# Patient Record
Sex: FEMALE | Race: WHITE | NOT HISPANIC OR LATINO | Employment: UNEMPLOYED | URBAN - METROPOLITAN AREA
[De-identification: names, ages, dates, MRNs, and addresses within clinical notes are randomized per-mention and may not be internally consistent; named-entity substitution may affect disease eponyms.]

---

## 2018-09-27 ENCOUNTER — DOCUMENTATION (OUTPATIENT)
Dept: BARIATRICS/WEIGHT MGMT | Facility: CLINIC | Age: 33
End: 2018-09-27

## 2018-09-27 RX ORDER — LOSARTAN POTASSIUM 25 MG/1
25 TABLET ORAL DAILY
COMMUNITY
End: 2019-02-05

## 2018-09-27 NOTE — PROGRESS NOTES
Weight Hx:     Karlee has been overweight for at least 7 years, has been 35 pounds or more overweight for at least 7 years, has been 100 pounds or more overweight for 2 or more years and started dieting at age 25.      Previous diet attempts include: Herbal Life, Low Carbohydrate, Calorie Counting, Lukas's Diet and Slim Fast; /diet plans; Ionamin/Adipex.  The most weight Karlee lost was - pounds on - but was only able to maintain that weight loss for -.  Her maximum lifetime weight is 245 pounds.

## 2018-10-09 ENCOUNTER — DOCUMENTATION (OUTPATIENT)
Dept: BARIATRICS/WEIGHT MGMT | Facility: CLINIC | Age: 33
End: 2018-10-09

## 2018-10-09 ENCOUNTER — OFFICE VISIT (OUTPATIENT)
Dept: BARIATRICS/WEIGHT MGMT | Facility: CLINIC | Age: 33
End: 2018-10-09

## 2018-10-09 ENCOUNTER — OFFICE VISIT (OUTPATIENT)
Dept: PSYCHIATRY | Facility: CLINIC | Age: 33
End: 2018-10-09

## 2018-10-09 VITALS
TEMPERATURE: 97.9 F | HEIGHT: 66 IN | DIASTOLIC BLOOD PRESSURE: 72 MMHG | SYSTOLIC BLOOD PRESSURE: 118 MMHG | RESPIRATION RATE: 18 BRPM | BODY MASS INDEX: 40.66 KG/M2 | WEIGHT: 253 LBS | OXYGEN SATURATION: 99 % | HEART RATE: 92 BPM

## 2018-10-09 DIAGNOSIS — E66.01 MORBID OBESITY (HCC): ICD-10-CM

## 2018-10-09 DIAGNOSIS — F43.21 SITUATIONAL DEPRESSION: ICD-10-CM

## 2018-10-09 DIAGNOSIS — Z87.891 HISTORY OF TOBACCO USE: ICD-10-CM

## 2018-10-09 DIAGNOSIS — I10 HYPERTENSION, UNSPECIFIED TYPE: ICD-10-CM

## 2018-10-09 DIAGNOSIS — R53.83 FATIGUE, UNSPECIFIED TYPE: ICD-10-CM

## 2018-10-09 DIAGNOSIS — D50.9 IRON DEFICIENCY ANEMIA, UNSPECIFIED IRON DEFICIENCY ANEMIA TYPE: ICD-10-CM

## 2018-10-09 DIAGNOSIS — R12 HEARTBURN: Primary | ICD-10-CM

## 2018-10-09 DIAGNOSIS — F43.9 FEELING STRESSED OUT: Primary | ICD-10-CM

## 2018-10-09 DIAGNOSIS — R06.09 DYSPNEA ON EXERTION: ICD-10-CM

## 2018-10-09 LAB
ALBUMIN SERPL-MCNC: 4.27 G/DL (ref 3.2–4.8)
ALBUMIN/GLOB SERPL: 2 G/DL (ref 1.5–2.5)
ALP SERPL-CCNC: 70 U/L (ref 25–100)
ALT SERPL-CCNC: 29 U/L (ref 7–40)
AST SERPL-CCNC: 23 U/L (ref 0–33)
BASOPHILS # BLD AUTO: 0.05 10*3/MM3 (ref 0–0.2)
BASOPHILS NFR BLD AUTO: 0.5 % (ref 0–1)
BILIRUB SERPL-MCNC: 0.4 MG/DL (ref 0.3–1.2)
BUN SERPL-MCNC: 12 MG/DL (ref 9–23)
BUN/CREAT SERPL: 20.7 (ref 7–25)
CALCIUM SERPL-MCNC: 9.1 MG/DL (ref 8.7–10.4)
CHLORIDE SERPL-SCNC: 105 MMOL/L (ref 99–109)
CHOLEST SERPL-MCNC: 183 MG/DL (ref 0–200)
CO2 SERPL-SCNC: 29 MMOL/L (ref 20–31)
CREAT SERPL-MCNC: 0.58 MG/DL (ref 0.6–1.3)
EOSINOPHIL # BLD AUTO: 0.25 10*3/MM3 (ref 0–0.3)
EOSINOPHIL NFR BLD AUTO: 2.4 % (ref 0–3)
ERYTHROCYTE [DISTWIDTH] IN BLOOD BY AUTOMATED COUNT: 12.3 % (ref 11.3–14.5)
GLOBULIN SER CALC-MCNC: 2.1 GM/DL
GLUCOSE SERPL-MCNC: 81 MG/DL (ref 70–100)
HCT VFR BLD AUTO: 38.2 % (ref 34.5–44)
HDLC SERPL-MCNC: 39 MG/DL (ref 40–60)
HGB BLD-MCNC: 12.8 G/DL (ref 11.5–15.5)
IMM GRANULOCYTES # BLD: 0.05 10*3/MM3 (ref 0–0.03)
IMM GRANULOCYTES NFR BLD: 0.5 % (ref 0–0.6)
LDLC SERPL CALC-MCNC: 99 MG/DL (ref 0–100)
LYMPHOCYTES # BLD AUTO: 3.17 10*3/MM3 (ref 0.6–4.8)
LYMPHOCYTES NFR BLD AUTO: 30.5 % (ref 24–44)
MCH RBC QN AUTO: 32.1 PG (ref 27–31)
MCHC RBC AUTO-ENTMCNC: 33.5 G/DL (ref 32–36)
MCV RBC AUTO: 95.7 FL (ref 80–99)
MONOCYTES # BLD AUTO: 0.9 10*3/MM3 (ref 0–1)
MONOCYTES NFR BLD AUTO: 8.7 % (ref 0–12)
NEUTROPHILS # BLD AUTO: 6.01 10*3/MM3 (ref 1.5–8.3)
NEUTROPHILS NFR BLD AUTO: 57.9 % (ref 41–71)
PLATELET # BLD AUTO: 297 10*3/MM3 (ref 150–450)
POTASSIUM SERPL-SCNC: 4 MMOL/L (ref 3.5–5.5)
PROT SERPL-MCNC: 6.4 G/DL (ref 5.7–8.2)
RBC # BLD AUTO: 3.99 10*6/MM3 (ref 3.89–5.14)
SODIUM SERPL-SCNC: 139 MMOL/L (ref 132–146)
TRIGL SERPL-MCNC: 226 MG/DL (ref 0–150)
TSH SERPL DL<=0.005 MIU/L-ACNC: 1.87 MIU/ML (ref 0.35–5.35)
VLDLC SERPL CALC-MCNC: 45.2 MG/DL
WBC # BLD AUTO: 10.38 10*3/MM3 (ref 3.5–10.8)

## 2018-10-09 PROCEDURE — 90791 PSYCH DIAGNOSTIC EVALUATION: CPT | Performed by: PSYCHOLOGIST

## 2018-10-09 PROCEDURE — 99204 OFFICE O/P NEW MOD 45 MIN: CPT | Performed by: PHYSICIAN ASSISTANT

## 2018-10-09 RX ORDER — CLONAZEPAM 0.25 MG/1
0.12 TABLET, ORALLY DISINTEGRATING ORAL 2 TIMES DAILY PRN
COMMUNITY

## 2018-10-09 RX ORDER — CEPHALEXIN 500 MG/1
500 CAPSULE ORAL 2 TIMES DAILY
COMMUNITY
End: 2018-11-14

## 2018-10-09 NOTE — PROGRESS NOTES
PROGRESS NOTE    Data:  Karlee De La Cruz is a 32 y.o. female who met with the undersigned for a scheduled individual outpatient therapy session from 9:00 - 9:40am.      Clinical Maneuvering/Intervention:      Pt talked about struggling with obesity for the past couple of years. Despite trying different weight loss plans and diets, the pt reported being unsuccessful in losing weight. She has tried weight watchers, working with a , and other diets. A psychological evaluation was conducted in order to assess past and current level of functioning. Areas assessed included, but were not limited to: perception of social support, perception of ability to face and deal with challenges in life (positive functioning), anxiety symptoms, depressive symptoms, perspective on beliefs/belief system, coping skills for stress, intelligence level, etc. Therapeutic rapport was established. Interventions conducted today were geared towards assessing the pt's readiness for weight loss surgery and identifying and psychological contraindications for undergoing such a major life change. Social support was deemed strong (specific to weight loss surgery/weight loss in this manner and in a general sense): mother, family members, and friends. Current psychological struggles were deemed moderate and included: recent separation from , starting school (but she enjoys it), feeling very upset/defeated for having gained quite a bit of weight, and raising her two children.Stress level was deemed moderate and related to  from her  and being overweight. Coping skills for distress and related to undergoing a major life change such as weight loss surgery/weight loss were deemed strong and included: having a very strong social support system, enjoying school, loving being a mother, and being more excited about weight loss surgery than nervous. The pt endorsed having characteristics of readiness to improve quality of  "life through the major life changes inherent in the journey of weight loss surgery as she could see how she has suffered enough (hit \"emotional rock bottom\") to now want to change. The pt could also articulate a deep sense of purpose in undergoing this major life change by saying that she want to do it to improve her self-esteem, quality of life, and be a better mother.  Demonstration of positive and appropriate dietary changes were not evident and she expressed planning to change soon (when she is told to do so).    Mental Status Exam  Hygiene:  good  Dress: normal  Attitude:  Cooperative and proactive  Motor Activity: normal  Speech: normal  Mood:  defeated but hopeful  Affect:  congruent  Thought Processes: normal  Thought Content:  normal  Suicidal Thoughts:  not endorsed  Homicidal Thoughts:  not endorsed  Crisis Safety Plan: not needed   Hallucinations:  none      Patient's Support Network Includes:  family, friends      Progress toward goal: there is evidence to suggest that she is taking measures to improve the quality of her life including seeking weight loss surgery.      Functional Status: moderate      Prognosis: good    Assessment      The pt presented to be stressed about being  from her  and feeling depressed about being overweight; otherwise she seems to be functioning well (likely due to having a strong social support system and other coping skills).     From a psychological standpoint, the pt presents as a good candidate for bariatric surgery.  She is motivated for the surgery, has showed readiness for the lifestyle change in terms of planning to soon adjust her eating habits, and seems to have appropriate expectations of how to prepare and how to live after surgery in order to lose weight successfully.      Plan      In order to diminish symptoms depression surrounding obesity, the pt is to follow up with her bariatric surgeon in order to receive weight loss surgery as soon as " feasible/appropriate and based on success with compliance to adhering to the proper diet. In order to diminish stress (and because she hopes to work things out with her ), she is to start couples counseling with him post-weight loss surgery. Instructions on how to pursue this were provided.    Deonna Franco, PhD, LP

## 2018-10-09 NOTE — PROGRESS NOTES
[facility] BARIATRIC SURGERY  [address]  [facility] [address]  [phone]      Patient  Name:  Karlee De La Cruz  :  1985      Date of Visit: 10/09/2018      Chief Complaint:  weight gain; unable to maintain weight loss    History of Present Illness:  Karlee De La Cruz is a 32 y.o. female who presents today for evaluation, education and consultation regarding weight loss surgery. The patient is interested in sleeve gastrectomy with Dr. Betancur.     Karlee has been overweight for at least 7 years, has been 35 pounds or more overweight for at least 7 years, has been 100 pounds or more overweight for 2 or more years and started dieting at age 25.  Previous diet attempts include: Herbal Life, Low Carbohydrate, Calorie Counting, Lukas's Diet and Slim Fast; Ionamin/Adipex.  Her maximum lifetime weight is 245 pounds.    As above, patient has been overweight for many years, with numerous failed dietary/weight loss attempts.  She now has obesity related comorbidities and as such has decided to pursue weight loss surgery.  All past medical, surgical, social and family history have been obtained and discussed today, as pertinent to bariatric surgery.     Past Medical History:   Diagnosis Date   • Anxiety    • Dyspepsia    • Dyspnea on exertion    • Fatigue    • Former smoker     quit    • Heartburn     prn Rolaids, denies prior eval   • Hypertension    • Iron deficiency anemia     r/t heavy menstrual bleeding   • Morbid obesity (CMS/HCC)      Past Surgical History:   Procedure Laterality Date   • LAPAROSCOPIC APPENDECTOMY     • LAPAROSCOPIC CHOLECYSTECTOMY      for stones       No Known Allergies    Current Outpatient Prescriptions:   •  cephalexin (KEFLEX) 500 MG capsule, Take 500 mg by mouth 2 (Two) Times a Day., Disp: , Rfl:   •  clonazePAM (KlonoPIN) 0.25 MG disintegrating tablet, Take 0.25 mg by mouth 2 (Two) Times a Day As Needed for Anxiety., Disp: , Rfl:    •  losartan (COZAAR) 25 MG tablet, Take 25 mg by mouth Daily., Disp: , Rfl:     Social History     Social History   • Marital status: Legally      Spouse name: N/A   • Number of children: N/A   • Years of education: N/A     Occupational History   • Not on file.     Social History Main Topics   • Smoking status: Former Smoker     Years: 10.00     Quit date: 2010   • Smokeless tobacco: Never Used   • Alcohol use Yes      Comment: only on the weekends   • Drug use: Yes      Comment: denies use 10+ years   • Sexual activity: Not on file     Other Topics Concern   • Not on file     Social History Narrative    Living in [place] w/ 2 daughters.  In [employer] school.     Family History   Problem Relation Age of Onset   • Hypertension Mother    • Hypertension Father    • Sleep apnea Father    • Hypertension Maternal Grandmother    • Hypertension Maternal Grandfather        Review of Systems:  Constitutional:  denies fevers, chills.  HEENT:  denies headache, ear pain or loss of hearing, blurred or double vision, nasal discharge or sore throat.  Cardiovascular:  denies hx heart disease, chest pain, palpitations, hx DVT.  Respiratory:  denies cough , wheezing, sleep apnea, asthma, hx PE.  Gastrointestinal:  reports heartburn and denies nausea, vomiting, abdominal pain, IBS, liver disease.  Genitourinary:  denies history of  frequent UTI, hematuria, dysuria, renal insufficiency.    Musculoskeletal:  denies joint pain, fibromyalgia, arthritis and autoimmune disease.  Neurological:  denies migraines, dizziness, confusion.  Psychiatric:  denies depression, bipolar disorder.  Endocrine:  denies glucose intolerance, diabetes, thyroid disease.  Hematologic:  denies bleeding disorder, hx blood transfusion.  Skin:  denies rashes, hx MRSA.    Physical Exam:  Vital Signs:  Weight: 115 kg (253 lb)   Body mass index is 41.46 kg/m².  Temp: 97.9 °F (36.6 °C)   Heart Rate: 92   BP: 118/72     Physical Exam    Constitutional: She is oriented to person, place, and time. She appears well-developed and well-nourished.   HENT:   Head: Normocephalic and atraumatic.   Eyes: Conjunctivae are normal. No scleral icterus.   Neck: Neck supple. No thyromegaly present.   Cardiovascular: Normal rate and regular rhythm.    No murmur heard.  Pulmonary/Chest: Effort normal and breath sounds normal. No respiratory distress. She has no wheezes. She has no rales.   Abdominal: Soft. Bowel sounds are normal. She exhibits no distension and no mass. There is no tenderness. No hernia.   lap hunter scars w/ keloids   Musculoskeletal: Normal range of motion. She exhibits no edema.   Neurological: She is alert and oriented to person, place, and time. Gait normal.   Skin: Skin is warm and dry. No rash noted.   Psychiatric: She has a normal mood and affect. Judgment normal.   Vitals reviewed.      Patient Active Problem List   Diagnosis   • Morbid obesity (CMS/HCC)   • Fatigue   • Dyspepsia   • Dyspnea on exertion   • Anxiety   • Hypertension   • Former smoker   • Heartburn   • Iron deficiency anemia       Assessment:    Karlee De La Cruz is a 32 y.o. female with medically complicated obesity pursuing sleeve gastrectomy.    Weight loss surgery is deemed medically necessary given the following obesity related comorbidities including hypertension with current Weight: 115 kg (253 lb) and Body mass index is 41.46 kg/m².    Plan:  The consultation plan and program requirements were reviewed with the patient.  The patient has been advised that a letter of medical support must be obtained from her primary care physician or referring provider. A psychological evaluation will be arranged.  A nutritional evaluation will be performed.  The patient was advised to start a high protein and low carbohydrate diet.  Necessary lifestyle modifications were discussed.  Instructions on how to access [employer] was given to the patient.  [employer] is an internet based educational  video that explains the surgical procedure chosen and answers basic questions regarding that procedure.     Preoperative testing will include: CBC, CMP, Lipids, TSH, H.Pylori UBT, Pulmonary Function Testing, CXR, EKG and EGD     The risks and benefits of the upper endoscopy were discussed with the patient in detail and all questions were answered.  Possibility of perforation, bleeding, aspiration, and anesthesia reaction were reviewed.  Patient agrees to proceed.    Additional preop clearances required prior to surgery: Cardiac.      The patient has been educated on expected postoperative lifestyle changes, including commitment to high protein diet, vitamin regimen, and exercise program.  They are aware that support groups are encouraged for optimal weight loss results. Patient understands that bariatric surgery is not cosmetic surgery but rather a tool to help make a lifelong commitment to lifestyle changes including diet, exercise, behavior modifications, and healthy habits. The surgical procedure was discussed with the patient and all questions were answered. The importance of avoiding ASA/ NSAIDS/ steroids/ tobacco/ hormones/ immunomodulators perioperatively was discussed.       LIANET Colin

## 2018-11-05 ENCOUNTER — HOSPITAL ENCOUNTER (OUTPATIENT)
Dept: PULMONOLOGY | Facility: HOSPITAL | Age: 33
Discharge: HOME | End: 2018-11-05
Admitting: PHYSICIAN ASSISTANT

## 2018-11-05 DIAGNOSIS — Z87.891 HISTORY OF TOBACCO USE: ICD-10-CM

## 2018-11-05 DIAGNOSIS — R06.09 DYSPNEA ON EXERTION: ICD-10-CM

## 2018-11-05 PROCEDURE — 94727 GAS DIL/WSHOT DETER LNG VOL: CPT | Performed by: INTERNAL MEDICINE

## 2018-11-05 PROCEDURE — 94729 DIFFUSING CAPACITY: CPT | Performed by: INTERNAL MEDICINE

## 2018-11-05 PROCEDURE — 94060 EVALUATION OF WHEEZING: CPT

## 2018-11-05 PROCEDURE — 94060 EVALUATION OF WHEEZING: CPT | Performed by: INTERNAL MEDICINE

## 2018-11-05 PROCEDURE — 94729 DIFFUSING CAPACITY: CPT

## 2018-11-05 PROCEDURE — 94727 GAS DIL/WSHOT DETER LNG VOL: CPT

## 2018-11-09 ENCOUNTER — RESULTS ENCOUNTER (OUTPATIENT)
Dept: BARIATRICS/WEIGHT MGMT | Facility: CLINIC | Age: 33
End: 2018-11-09

## 2018-11-09 DIAGNOSIS — R12 HEARTBURN: ICD-10-CM

## 2018-11-14 ENCOUNTER — OFFICE VISIT (OUTPATIENT)
Dept: BARIATRICS/WEIGHT MGMT | Facility: CLINIC | Age: 33
End: 2018-11-14

## 2018-11-14 VITALS
OXYGEN SATURATION: 99 % | HEIGHT: 66 IN | TEMPERATURE: 97.8 F | HEART RATE: 78 BPM | WEIGHT: 253 LBS | RESPIRATION RATE: 18 BRPM | DIASTOLIC BLOOD PRESSURE: 110 MMHG | SYSTOLIC BLOOD PRESSURE: 142 MMHG | BODY MASS INDEX: 40.66 KG/M2

## 2018-11-14 DIAGNOSIS — R12 HEARTBURN: Primary | ICD-10-CM

## 2018-11-14 PROCEDURE — 99214 OFFICE O/P EST MOD 30 MIN: CPT | Performed by: PHYSICIAN ASSISTANT

## 2018-11-14 NOTE — PROGRESS NOTES
[facility] BARIATRIC SURGERY  [address]  [facility] [address] [phone]      Patient  Name:  Karlee De La Cruz  :  1985      Date of Visit: 10/09/2018      Chief Complaint:  weight gain; unable to maintain weight loss, heartburn    History of Present Illness:  Karlee De La Cruz is a 33 y.o. female pursuing sleeve gastrectomy with Dr. Askew.     Presents today to update H&P prior to EGD for further evaluation of heartburn.  Denies prior eval.  Takes Rolaids PRN.  H.Pylori UBT (P).  No issues w/ dysphagia/N/V/AP.      Past Medical History:   Diagnosis Date   • Anxiety    • Dyspepsia    • Dyspnea on exertion    • Fatigue    • Former smoker     quit    • Heartburn     prn Rolaids, denies prior eval   • Hypertension    • Iron deficiency anemia     r/t heavy menstrual bleeding   • Morbid obesity (CMS/HCC)      Past Surgical History:   Procedure Laterality Date   • LAPAROSCOPIC APPENDECTOMY     • LAPAROSCOPIC CHOLECYSTECTOMY      for stones       No Known Allergies    Current Outpatient Medications:   •  clonazePAM (KlonoPIN) 0.25 MG disintegrating tablet, Take 0.25 mg by mouth 2 (Two) Times a Day As Needed for Anxiety., Disp: , Rfl:   •  losartan (COZAAR) 25 MG tablet, Take 25 mg by mouth Daily., Disp: , Rfl:     Social History     Socioeconomic History   • Marital status: Legally      Spouse name: Not on file   • Number of children: Not on file   • Years of education: Not on file   • Highest education level: Not on file   Social Needs   • Financial resource strain: Not on file   • Food insecurity - worry: Not on file   • Food insecurity - inability: Not on file   • Transportation needs - medical: Not on file   • Transportation needs - non-medical: Not on file   Occupational History   • Not on file   Tobacco Use   • Smoking status: Former Smoker     Years: 10.00     Last attempt to quit: 2010     Years since quittin.8   • Smokeless tobacco: Never  Used   Substance and Sexual Activity   • Alcohol use: Yes     Comment: only on the weekends   • Drug use: Yes     Comment: denies use 10+ years   • Sexual activity: Not on file   Other Topics Concern   • Not on file   Social History Narrative    Living in [facility] [place] w/ 2 daughters.  In [employer] school.     Family History   Problem Relation Age of Onset   • Hypertension Mother    • Hypertension Father    • Sleep apnea Father    • Hypertension Maternal Grandmother    • Hypertension Maternal Grandfather        Review of Systems:  Constitutional:  denies fevers, chills.  HEENT:  denies headache, ear pain or loss of hearing, blurred or double vision, nasal discharge or sore throat.  Cardiovascular:  denies hx heart disease, chest pain, palpitations, hx DVT.  Respiratory:  denies cough , wheezing, sleep apnea, asthma, hx PE.  Gastrointestinal:  reports heartburn and denies nausea, vomiting, abdominal pain, IBS, liver disease.  Genitourinary:  denies history of  frequent UTI, hematuria, dysuria, renal insufficiency.    Musculoskeletal:  denies joint pain, fibromyalgia, arthritis and autoimmune disease.  Neurological:  denies migraines, dizziness, confusion.  Psychiatric:  denies depression, bipolar disorder.  Endocrine:  denies glucose intolerance, diabetes, thyroid disease.  Hematologic:  denies bleeding disorder, hx blood transfusion.  Skin:  denies rashes, hx MRSA.    Reviewed today - accurate.    Physical Exam:  Vital Signs:  Weight: 115 kg (253 lb)   Body mass index is 41.46 kg/m².  Temp: 97.8 °F (36.6 °C)   Heart Rate: 78   BP: (!) 142/110     Physical Exam   Constitutional: She is oriented to person, place, and time. She appears well-developed and well-nourished.   HENT:   Head: Normocephalic and atraumatic.   Eyes: Conjunctivae are normal. No scleral icterus.   Neck: Neck supple. No thyromegaly present.   Cardiovascular: Normal rate and regular rhythm.   No murmur heard.  Pulmonary/Chest: Effort normal and  breath sounds normal. No respiratory distress. She has no wheezes. She has no rales.   Abdominal: Soft. Bowel sounds are normal. She exhibits no distension and no mass. There is no tenderness. No hernia.   lap hunter scars w/ keloids   Musculoskeletal: Normal range of motion. She exhibits no edema.   Neurological: She is alert and oriented to person, place, and time. Gait normal.   Skin: Skin is warm and dry. No rash noted.   Psychiatric: She has a normal mood and affect. Judgment normal.   Vitals reviewed.      Patient Active Problem List   Diagnosis   • Morbid obesity (CMS/HCC)   • Fatigue   • Dyspepsia   • Dyspnea on exertion   • Anxiety   • Hypertension   • Former smoker   • Heartburn   • Iron deficiency anemia       Assessment:  Karlee De La Cruz is a 33 y.o. female with medically complicated obesity pursuing LSG.    ICD-10-CM ICD-9-CM   1. Heartburn R12 787.1         Plan:   EGD w/ Dr. Askew for further eval.    The risks and benefits of the upper endoscopy were discussed with the patient in detail and all questions were answered.  Possibility of perforation, bleeding, aspiration, and anesthesia reaction were reviewed.  Patient agrees to proceed.

## 2018-11-15 LAB — UREA BREATH TEST QL: NEGATIVE

## 2018-11-19 ENCOUNTER — LAB REQUISITION (OUTPATIENT)
Dept: LAB | Facility: HOSPITAL | Age: 33
End: 2018-11-19

## 2018-11-19 DIAGNOSIS — K21.9 GASTRO-ESOPHAGEAL REFLUX DISEASE WITHOUT ESOPHAGITIS: ICD-10-CM

## 2018-11-19 PROCEDURE — 88305 TISSUE EXAM BY PATHOLOGIST: CPT | Performed by: SURGERY

## 2018-11-21 LAB
CYTO UR: NORMAL
LAB AP CASE REPORT: NORMAL
LAB AP CLINICAL INFORMATION: NORMAL
PATH REPORT.FINAL DX SPEC: NORMAL
PATH REPORT.GROSS SPEC: NORMAL

## 2018-11-26 DIAGNOSIS — R12 HEARTBURN: ICD-10-CM

## 2018-11-29 ENCOUNTER — CONSULT (OUTPATIENT)
Dept: CARDIOLOGY | Facility: CLINIC | Age: 33
End: 2018-11-29

## 2018-11-29 VITALS
HEIGHT: 65 IN | OXYGEN SATURATION: 98 % | HEART RATE: 88 BPM | WEIGHT: 255 LBS | DIASTOLIC BLOOD PRESSURE: 100 MMHG | BODY MASS INDEX: 42.49 KG/M2 | SYSTOLIC BLOOD PRESSURE: 132 MMHG

## 2018-11-29 DIAGNOSIS — I10 ESSENTIAL HYPERTENSION: Primary | ICD-10-CM

## 2018-11-29 PROCEDURE — 99202 OFFICE O/P NEW SF 15 MIN: CPT | Performed by: INTERNAL MEDICINE

## 2018-11-29 PROCEDURE — 93000 ELECTROCARDIOGRAM COMPLETE: CPT | Performed by: INTERNAL MEDICINE

## 2018-11-29 NOTE — PROGRESS NOTES
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    CC:  Chief Complaint   Patient presents with   • Hypertension   • Surgical Clearance     gastric sleeve       PROBLEM LIST:  1. HTN  2. HLD  1. 10/9/18   HDL 39 LDL 99  3. Former tobacco abuse  1. Cessation   4. Morbid obesity  5. Chronic fatigue  6. Dyspnea on exertion   7. GERD  8.   9. History of iron deficiency anemia  1. D/t menorrhagia  10.  Surgical history:  1. Appendectomy   2. Cholecystectomy     Allergies  No Known Allergies    Current Medications    Current Outpatient Medications:   •  clonazePAM (KlonoPIN) 0.25 MG disintegrating tablet, Take 0.25 mg by mouth 2 (Two) Times a Day As Needed for Anxiety., Disp: , Rfl:   •  losartan (COZAAR) 25 MG tablet, Take 25 mg by mouth Daily., Disp: , Rfl:      History of Present Illness   HPI  This is a 33-year-old female with the above-mentioned PMH who presents for consult for evaluation of cardiac clearance for sleeve gastrectomy.  Was exercising without issue but now with probable plantar fasciitis.  No cardiac issues previously.    Pt denies any chest pain, dyspnea at rest, dyspnea on exertion, orthopnea, PND, palpitations, lower extremity edema, or claudication. Pt denies history of CHF, DVT, PE, MI, CVA, TIA, or rheumatic fever.       ROS  Review of Systems   Constitution: Positive for weight gain. Negative for chills, fever, weakness, malaise/fatigue, night sweats and weight loss.   HENT: Negative for hearing loss and nosebleeds.    Eyes: Negative for blurred vision, vision loss in left eye, vision loss in right eye, visual disturbance and visual halos.   Cardiovascular: Negative for chest pain, claudication, cyanosis, dyspnea on exertion,  irregular heartbeat, leg swelling, near-syncope, orthopnea, palpitations, paroxysmal nocturnal dyspnea and syncope.   Respiratory: Negative for cough, hemoptysis, shortness of breath, snoring and wheezing.    Endocrine: Negative for cold intolerance, heat intolerance, polydipsia, polyphagia and polyuria.   Hematologic/Lymphatic: Negative for adenopathy and bleeding problem. Does not bruise/bleed easily.   Skin: Negative for dry skin, poor wound healing and rash.   Musculoskeletal: Positive for joint pain. Negative for falls, joint swelling, muscle cramps, muscle weakness, myalgias and neck pain.   Gastrointestinal: Negative for bloating, abdominal pain, change in bowel habit, bowel incontinence, constipation, diarrhea, dysphagia, excessive appetite, heartburn, hematemesis, hematochezia, jaundice, melena, nausea and vomiting.   Genitourinary: Negative for bladder incontinence, dysuria, flank pain, hematuria, hesitancy and nocturia.   Neurological: Negative for aphonia, excessive daytime sleepiness, dizziness, focal weakness, headaches, light-headedness, loss of balance, seizures, sensory change, tremors and vertigo.   Psychiatric/Behavioral: Negative for altered mental status, depression, memory loss, substance abuse and suicidal ideas. The patient is not nervous/anxious.        SOCIAL HX  Social History     Socioeconomic History   • Marital status: Legally      Spouse name: Not on file   • Number of children: Not on file   • Years of education: Not on file   • Highest education level: Not on file   Social Needs   • Financial resource strain: Not on file   • Food insecurity - worry: Not on file   • Food insecurity - inability: Not on file   • Transportation needs - medical: Not on file   • Transportation needs - non-medical: Not on file   Occupational History   • Not on file   Tobacco Use   • Smoking status: Former Smoker     Years: 10.00     Last attempt to quit:      Years since quittin.9   •  "Smokeless tobacco: Never Used   Substance and Sexual Activity   • Alcohol use: Yes     Comment: only on the weekends   • Drug use: Yes     Comment: denies use 10+ years   • Sexual activity: Defer   Other Topics Concern   • Not on file   Social History Narrative    Living in [facility] w/ 2 daughters.  In [employer] school.       FAMILY HX  Family History   Problem Relation Age of Onset   • Hypertension Mother    • Hypertension Father    • Sleep apnea Father    • Hypertension Maternal Grandmother    • Hypertension Maternal Grandfather        Vitals:    11/29/18 1107   BP: 132/100   BP Location: Right arm   Patient Position: Sitting   Pulse: 88   SpO2: 98%   Weight: 116 kg (255 lb)   Height: 165.1 cm (65\")       PHYSICAL EXAMINATION:  Physical Exam   Constitutional: She is oriented to person, place, and time. She appears well-developed and well-nourished. No distress.   HENT:   Head: Normocephalic and atraumatic.   Nose: Nose normal.   Mouth/Throat: Uvula is midline, oropharynx is clear and moist and mucous membranes are normal.   Eyes: Conjunctivae and EOM are normal. Pupils are equal, round, and reactive to light. No scleral icterus.   Neck: Normal range of motion. Neck supple. No hepatojugular reflux and no JVD present. Carotid bruit is not present. No tracheal deviation present. No thyromegaly present.   Cardiovascular: Normal rate, regular rhythm, S1 normal, S2 normal, intact distal pulses and normal pulses. PMI is not displaced. Exam reveals no gallop, no distant heart sounds, no friction rub, no midsystolic click and no opening snap.   No murmur heard.  Pulses:       Radial pulses are 2+ on the right side, and 2+ on the left side.        Dorsalis pedis pulses are 2+ on the right side, and 2+ on the left side.        Posterior tibial pulses are 2+ on the right side, and 2+ on the left side.   Pulmonary/Chest: Effort normal and breath sounds normal. She has no wheezes. She has no rhonchi. She has no rales. "   Abdominal: Soft. Bowel sounds are normal. She exhibits no mass. There is no tenderness. There is no guarding.   Musculoskeletal: She exhibits no edema or tenderness.   Lymphadenopathy:     She has no cervical adenopathy.   Neurological: She is alert and oriented to person, place, and time.   Skin: Skin is warm, dry and intact. No rash noted. No cyanosis or erythema. Nails show no clubbing.   Psychiatric: She has a normal mood and affect. Her behavior is normal.   Nursing note and vitals reviewed.      Diagnostic Data:    ECG 12 Lead  Date/Time: 11/29/2018 11:39 AM  Performed by: Renny Victor MD  Authorized by: Renny Victor MD   Comparison: not compared with previous ECG   Rhythm: sinus rhythm  Clinical impression: non-specific ECG             Lab Results   Component Value Date    CHLPL 183 10/09/2018    TRIG 226 (H) 10/09/2018    HDL 39 (L) 10/09/2018     Lab Results   Component Value Date    BUN 12 10/09/2018    CREATININE 0.58 (L) 10/09/2018     10/09/2018    K 4.0 10/09/2018     10/09/2018    CO2 29.0 10/09/2018     No results found for: HGBA1C  Lab Results   Component Value Date    HGB 12.8 10/09/2018    HCT 38.2 10/09/2018     10/09/2018       ASSESSMENT:   Diagnosis Plan   1. Essential hypertension           PLAN:  Acceptable cardiac risk for gastric sleeve.    Scribed for Renny Victor MD by Marley Justice PA-C. 11/29/2018  11:38 AM   I, Renny Victor MD, personally performed the services described in this documentation as scribed by the above named individual in my presence, and it is both accurate and complete.  11/29/2018  11:39 AM    Renny Victor MD, [facility]

## 2019-01-23 DIAGNOSIS — R06.00 DYSPNEA, UNSPECIFIED TYPE: Primary | ICD-10-CM

## 2019-01-23 DIAGNOSIS — R53.83 FATIGUE, UNSPECIFIED TYPE: ICD-10-CM

## 2019-01-25 ENCOUNTER — HOSPITAL ENCOUNTER (OUTPATIENT)
Dept: GENERAL RADIOLOGY | Facility: HOSPITAL | Age: 34
Discharge: HOME | End: 2019-01-25
Admitting: PHYSICIAN ASSISTANT

## 2019-01-25 ENCOUNTER — LAB (OUTPATIENT)
Dept: LAB | Facility: HOSPITAL | Age: 34
End: 2019-01-25

## 2019-01-25 DIAGNOSIS — R06.00 DYSPNEA, UNSPECIFIED TYPE: ICD-10-CM

## 2019-01-25 DIAGNOSIS — R53.83 FATIGUE, UNSPECIFIED TYPE: ICD-10-CM

## 2019-01-25 LAB
ALBUMIN SERPL-MCNC: 4.34 G/DL (ref 3.2–4.8)
ALBUMIN/GLOB SERPL: 2 G/DL (ref 1.5–2.5)
ALP SERPL-CCNC: 74 U/L (ref 25–100)
ALT SERPL W P-5'-P-CCNC: 34 U/L (ref 7–40)
ANION GAP SERPL CALCULATED.3IONS-SCNC: 7 MMOL/L (ref 3–11)
AST SERPL-CCNC: 23 U/L (ref 0–33)
BILIRUB SERPL-MCNC: 0.3 MG/DL (ref 0.3–1.2)
BUN BLD-MCNC: 15 MG/DL (ref 9–23)
BUN/CREAT SERPL: 22.7 (ref 7–25)
CALCIUM SPEC-SCNC: 9.5 MG/DL (ref 8.7–10.4)
CHLORIDE SERPL-SCNC: 105 MMOL/L (ref 99–109)
CO2 SERPL-SCNC: 27 MMOL/L (ref 20–31)
CREAT BLD-MCNC: 0.66 MG/DL (ref 0.6–1.3)
DEPRECATED RDW RBC AUTO: 43.1 FL (ref 37–54)
ERYTHROCYTE [DISTWIDTH] IN BLOOD BY AUTOMATED COUNT: 12.2 % (ref 11.3–14.5)
GFR SERPL CREATININE-BSD FRML MDRD: 103 ML/MIN/1.73
GLOBULIN UR ELPH-MCNC: 2.2 GM/DL
GLUCOSE BLD-MCNC: 92 MG/DL (ref 70–100)
HCT VFR BLD AUTO: 40.8 % (ref 34.5–44)
HGB BLD-MCNC: 13.5 G/DL (ref 11.5–15.5)
MCH RBC QN AUTO: 32.2 PG (ref 27–31)
MCHC RBC AUTO-ENTMCNC: 33.1 G/DL (ref 32–36)
MCV RBC AUTO: 97.4 FL (ref 80–99)
PLATELET # BLD AUTO: 289 10*3/MM3 (ref 150–450)
PMV BLD AUTO: 10.6 FL (ref 6–12)
POTASSIUM BLD-SCNC: 4.3 MMOL/L (ref 3.5–5.5)
PROT SERPL-MCNC: 6.5 G/DL (ref 5.7–8.2)
RBC # BLD AUTO: 4.19 10*6/MM3 (ref 3.89–5.14)
SODIUM BLD-SCNC: 139 MMOL/L (ref 132–146)
WBC NRBC COR # BLD: 8.36 10*3/MM3 (ref 3.5–10.8)

## 2019-01-25 PROCEDURE — 71046 X-RAY EXAM CHEST 2 VIEWS: CPT

## 2019-01-25 PROCEDURE — 85027 COMPLETE CBC AUTOMATED: CPT

## 2019-01-25 PROCEDURE — 80053 COMPREHEN METABOLIC PANEL: CPT

## 2019-01-25 PROCEDURE — 36415 COLL VENOUS BLD VENIPUNCTURE: CPT

## 2019-01-29 ENCOUNTER — CONSULT (OUTPATIENT)
Dept: BARIATRICS/WEIGHT MGMT | Facility: CLINIC | Age: 34
End: 2019-01-29

## 2019-01-29 VITALS
HEART RATE: 68 BPM | HEIGHT: 66 IN | RESPIRATION RATE: 18 BRPM | DIASTOLIC BLOOD PRESSURE: 66 MMHG | SYSTOLIC BLOOD PRESSURE: 118 MMHG | TEMPERATURE: 97.9 F | BODY MASS INDEX: 40.9 KG/M2 | OXYGEN SATURATION: 99 % | WEIGHT: 254.5 LBS

## 2019-01-29 DIAGNOSIS — E66.01 OBESITY, CLASS III, BMI 40-49.9 (MORBID OBESITY) (HCC): Primary | ICD-10-CM

## 2019-01-29 PROCEDURE — 99214 OFFICE O/P EST MOD 30 MIN: CPT | Performed by: SURGERY

## 2019-01-29 RX ORDER — SODIUM CHLORIDE 0.9 % (FLUSH) 0.9 %
3-10 SYRINGE (ML) INJECTION AS NEEDED
Status: CANCELLED | OUTPATIENT
Start: 2019-01-29

## 2019-01-29 RX ORDER — PANTOPRAZOLE SODIUM 40 MG/10ML
40 INJECTION, POWDER, LYOPHILIZED, FOR SOLUTION INTRAVENOUS ONCE
Status: CANCELLED | OUTPATIENT
Start: 2019-01-29 | End: 2019-01-29

## 2019-01-29 RX ORDER — SODIUM CHLORIDE 0.9 % (FLUSH) 0.9 %
3 SYRINGE (ML) INJECTION EVERY 12 HOURS SCHEDULED
Status: CANCELLED | OUTPATIENT
Start: 2019-01-29

## 2019-01-29 RX ORDER — SODIUM CHLORIDE, SODIUM LACTATE, POTASSIUM CHLORIDE, CALCIUM CHLORIDE 600; 310; 30; 20 MG/100ML; MG/100ML; MG/100ML; MG/100ML
100 INJECTION, SOLUTION INTRAVENOUS CONTINUOUS
Status: CANCELLED | OUTPATIENT
Start: 2019-01-29

## 2019-01-29 RX ORDER — SCOLOPAMINE TRANSDERMAL SYSTEM 1 MG/1
1 PATCH, EXTENDED RELEASE TRANSDERMAL CONTINUOUS
Status: CANCELLED | OUTPATIENT
Start: 2019-01-29 | End: 2019-02-01

## 2019-01-29 RX ORDER — ACETAMINOPHEN 325 MG/1
650 TABLET ORAL ONCE
Status: CANCELLED | OUTPATIENT
Start: 2019-01-29 | End: 2019-01-29

## 2019-01-29 RX ORDER — CHLORHEXIDINE GLUCONATE 0.12 MG/ML
15 RINSE ORAL ONCE
Status: CANCELLED | OUTPATIENT
Start: 2019-01-29

## 2019-01-30 PROBLEM — E66.01 OBESITY, CLASS III, BMI 40-49.9 (MORBID OBESITY) (HCC): Status: ACTIVE | Noted: 2019-01-30

## 2019-02-05 ENCOUNTER — ANESTHESIA EVENT (OUTPATIENT)
Dept: PERIOP | Facility: HOSPITAL | Age: 34
End: 2019-02-05

## 2019-02-05 ENCOUNTER — APPOINTMENT (OUTPATIENT)
Dept: PREADMISSION TESTING | Facility: HOSPITAL | Age: 34
End: 2019-02-05

## 2019-02-05 RX ORDER — LOSARTAN POTASSIUM AND HYDROCHLOROTHIAZIDE 12.5; 5 MG/1; MG/1
1 TABLET ORAL DAILY
COMMUNITY
End: 2019-02-08 | Stop reason: HOSPADM

## 2019-02-05 RX ORDER — SODIUM CHLORIDE 0.9 % (FLUSH) 0.9 %
3 SYRINGE (ML) INJECTION EVERY 12 HOURS SCHEDULED
Status: CANCELLED | OUTPATIENT
Start: 2019-02-05

## 2019-02-05 RX ORDER — FAMOTIDINE 10 MG/ML
20 INJECTION, SOLUTION INTRAVENOUS ONCE
Status: CANCELLED | OUTPATIENT
Start: 2019-02-05 | End: 2019-02-05

## 2019-02-05 RX ORDER — SODIUM CHLORIDE 0.9 % (FLUSH) 0.9 %
3-10 SYRINGE (ML) INJECTION AS NEEDED
Status: CANCELLED | OUTPATIENT
Start: 2019-02-05

## 2019-02-05 NOTE — H&P (VIEW-ONLY)
"[facility]  [address]  [facility] [id number] [phone]      Patient  Name:  Karlee De La Cruz  :  1985      Date of Visit: 19    Chief Complaint:  weight gain; unable to maintain weight loss. Preop LSG    History of Present Illness:  Karlee De La Cruz is a 33 y.o. female who presents today for evaluation, education and consultation regarding weight loss surgery. Since last seen 18 she has lost 1 lb. The patient returns for final visit prior to LSG.  Original intake evaluation Letty Hagan PA-C 10/18 reviewed. 32 yo [place] WF [facility].  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  Mother smokes but pt doesn't live w her and well aware of our 2nd hand smoke policy to hopefully avoid delayed leak. Co-worker is my pt Mani \"eats junk\".  The patient has had issues with morbid obesity for years and only temporary success with non-surgical methods of weight loss.  The patient is seeking LSG to help with the morbid obesity related conditions of anxiety, asthma, BARNETT, fatigue, GERD, HTN, BELLA, periph edema, HLD.        Past Medical History:   Diagnosis Date   • Abnormal CXR     degen spine changes noted   • Anxiety    • Asthma    • Dyspepsia    • Dyspnea on exertion    • Fatigue    • Former smoker     quit    • GERD (gastroesophageal reflux disease)     EGD GDW 18 Gr II esophagitis, 37 cm, no HH, neg h. pylori, DE bx's c/w reflux   • Heartburn     prn Rolaids, denies prior eval   • HLD (hyperlipidemia)    • Hypertension    • Incomplete right bundle branch block    • Iron deficiency anemia     r/t heavy menstrual bleeding   • Morbid obesity (CMS/HCC)    •  (normal spontaneous vaginal delivery)     x 2 w/o complic   • Peripheral edema      Past Surgical History:   Procedure Laterality Date   • ENDOSCOPY     • LAPAROSCOPIC APPENDECTOMY      [facility]   • LAPAROSCOPIC CHOLECYSTECTOMY      [facility]       No Known Allergies    Current Outpatient Medications:   •  clonazePAM (KlonoPIN) 0.25 MG disintegrating tablet, Take 0.125 mg by mouth 2 (Two) Times a Day As Needed for Anxiety. 1/2 tablet prn, Disp: , Rfl:   •  losartan-hydrochlorothiazide (HYZAAR) 50-12.5 MG per tablet, Take 1 tablet by mouth Daily., Disp: , Rfl:     Social History     Socioeconomic History   • Marital status: Legally      Spouse name: Not on file   • Number of children: Not on file   • Years of education: Not on file   • Highest education level: Not on file   Social Needs   • Financial resource strain: Not on file   • Food insecurity - worry: Not on file   • Food insecurity - inability: Not on file   • Transportation needs - medical: Not on file   • Transportation needs - non-medical: Not on file   Occupational History   • Not on file   Tobacco Use   • Smoking status: Former Smoker     Packs/day: 1.00     Years: 10.00     Pack years: 10.00     Types: Cigarettes     Last attempt to quit:      Years since quittin.1   • Smokeless tobacco: Never Used   Substance and Sexual Activity   • Alcohol use: Yes     Comment: only on the weekends   • Drug use: No   • Sexual activity: Defer   Other Topics Concern   • Not on file   Social History Narrative    Living in [facility] w/ 2 daughters.  In [employer] school.     Family History   Problem Relation Age of Onset   • Hypertension Mother    • Hypertension Father    • Sleep apnea Father    • Hypertension Maternal Grandmother    • Hypertension Maternal Grandfather        Review of Systems   Constitutional: Positive for fatigue and unexpected weight gain. Negative for chills, diaphoresis, fever and unexpected weight loss.   HENT: Negative for congestion and facial swelling.    Eyes: Negative for blurred vision, double vision and discharge.   Respiratory: Negative for chest tightness, shortness of breath and stridor.    Cardiovascular: Negative for chest pain, palpitations  and leg swelling.   Gastrointestinal: Negative for blood in stool.   Endocrine: Negative for polydipsia.   Genitourinary: Negative for hematuria.   Musculoskeletal: Positive for arthralgias.   Skin: Negative for color change.   Allergic/Immunologic: Negative for immunocompromised state.   Neurological: Negative for confusion.   Psychiatric/Behavioral: Negative for self-injury.       I have reviewed the ROS and confirm that it's accurate today.    Physical Exam:  Vital Signs:  Weight: 115 kg (254 lb 8 oz)   Body mass index is 41.71 kg/m².  Temp: 97.9 °F (36.6 °C)   Heart Rate: 68   BP: 118/66     Physical Exam   Constitutional: She is oriented to person, place, and time. She appears well-developed and well-nourished.   HENT:   Head: Normocephalic and atraumatic.   Nose: Nose normal.   Eyes: Conjunctivae and EOM are normal. Pupils are equal, round, and reactive to light.   Neck: Normal range of motion. Neck supple. Carotid bruit is not present. No tracheal deviation present. No thyromegaly present.   Cardiovascular: Normal rate, regular rhythm and normal heart sounds.   Pulmonary/Chest: Effort normal and breath sounds normal. No respiratory distress.   Abdominal: Soft. She exhibits no distension. There is no hepatosplenomegaly. There is no tenderness.   Lap appy/hunter scars, star tattoo RLQ   Musculoskeletal: Normal range of motion. She exhibits no edema or deformity.   Neurological: She is alert and oriented to person, place, and time. No cranial nerve deficit. Coordination normal.   Skin: Skin is warm and dry. No rash noted.   Psychiatric: She has a normal mood and affect. Her behavior is normal. Judgment and thought content normal.   Vitals reviewed.      Patient Active Problem List   Diagnosis   • Morbid obesity (CMS/HCC)   • Fatigue   • Dyspepsia   • Dyspnea on exertion   • Anxiety   • Hypertension   • Former smoker   • Heartburn   • Iron deficiency anemia   • Obesity, Class III, BMI 40-49.9 (morbid obesity)  (CMS/[facility])       Assessment:    Karlee De La Cruz is a 33 y.o. year old female with medically complicated obesity.    Weight loss surgery is deemed medically necessary given the following obesity related comorbidities including anxiety, asthma, BARNETT, fatigue, GERD, HTN, BELLA, periph edema, HLD with current Weight: 115 kg (254 lb 8 oz) and Body mass index is 41.71 kg/m²..    Encounter Diagnosis   Name Primary?   • Obesity, Class III, BMI 40-49.9 (morbid obesity) (CMS/[facility]) Yes      Patient is aware that surgery is a tool, and that weight loss is not guaranteed but only seen in the context of appropriate use, follow up and exercise.    The patient was present for an approximately a 2.5 hour discussion of the purpose of weight loss surgery, how WLS is a tool to assist in achieving weight loss goals, the most common complications and how best to avoid them, and the strategies for short and long term weight loss.  Ample opportunity to discuss questions was available both in group and during the time of individual examination.    I reviewed:  1/19 Jacob Bolanos #60 x 3  1/25/19 CXR degen spine changes  11/29/18 EKG iRBBB  1/25/19 CBC, CMP decreased MCH  10/9/18 Psych Deonna Franco PhD good candidate  10/9/18 dietitian subhash  11/14/18 HBT neg  10/9/18 lipid panel , low HDL 39  EGD GDW 11/19/18 11/29/18 Cards clearance Dr. Victor  11/5/18 PFT's     Please see scanned records that I have reviewed and signed off on today.  All of this in addition to the patient's unique history and exam has been taken into consideration in determining their appropriate candidacy for weight loss surgery.    Complications  of laparoscopic/possible robotic gastric sleeve were discussed. The patient is well aware of the potential complications of surgery that include but not limited to bleeding, infections, deep venous thrombosis, pulmonary embolism, pulmonary complications such as pneumonia, cardiac events, hernias, small bowel  "obstruction, damage to the spleen or other organs, bowel injury, disfiguring scars, failure to lose weight, need for additional surgery, conversion to an open procedure, and death. Patient is also aware of complications which apply in this particular procedure that can include but are not limited to a \"leak\" at the staple line which in some instances may require conversion to gastric bypass.    The patient is aware if a hiatal hernia is encountered, it likely will be repaired.  R/B/A Rx to hiatal hernia repair were discussed as outlined in our long consent form.  Briefly risks in addition to those for LSG include recurrent hernia, NAMITA, dysphagia, esophageal injury, pneumothorax, injury to the vagus nerves, injury to the thoracic duct, aorta or vena cava.    I discussed avoiding all tobacco products and second hand smoke at least 2 weeks pre-operatively and 6 weeks post-operatively to minimize the risk of sleeve leak.  This included discussing the importance of avoiding even secondhand smoke as the risk of leak is increased.  Examples discussed:  I made it very clear that the patient understands they should avoid even riding in a car where someone has previously smoked in the last 2 weeks, living in a house where someone smokes (even if it's in a separate room/patio/attached garage, etc.) we discussed that they should not have a conversation with a group of people who are smoking even if it's outside.  They can be around wood burning fires and barbecue.  I told them I do not know if marijuana has a same effects but my overall recommendation is to avoid it for 2 weeks prior in 6 weeks after surgery.  They also are aware that nicotine may also increase the risk of leak and I strongly encouraged him to avoid that as well for 2 weeks prior in 6 weeks after surgery.    Discussed the risks, benefits and alternative therapies at great length as outlined in our extensive consent forms, consent videos, and educational " teaching process under the direction of the center's .    A copy of the patient's signed informed consent is on file.      Plan:  Laparoscopic sleeve gastrectomy. Thank you Chandler for the opportunity to evaluate Ms. Dorothy Askew MD

## 2019-02-05 NOTE — PROGRESS NOTES
"[facility] BARIATRIC SURGERY  [address]  [facility] [id number] [phone]      Patient  Name:  Karlee De La Cruz  :  1985      Date of Visit: 19    Chief Complaint:  weight gain; unable to maintain weight loss. Preop LSG    History of Present Illness:  Karlee De La Cruz is a 33 y.o. female who presents today for evaluation, education and consultation regarding weight loss surgery. Since last seen 18 she has lost 1 lb. The patient returns for final visit prior to LSG.  Original intake evaluation Letty Hagan PA-C 10/18 reviewed. 34 yo MO WF from [facility].  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  Mother smokes but pt doesn't live w her and well aware of our 2nd hand smoke policy to hopefully avoid delayed leak. Co-worker is my pt Mani \"eats junk\".  The patient has had issues with morbid obesity for years and only temporary success with non-surgical methods of weight loss.  The patient is seeking LSG to help with the morbid obesity related conditions of anxiety, asthma, BARNETT, fatigue, GERD, HTN, BELLA, periph edema, HLD.        Past Medical History:   Diagnosis Date   • Abnormal CXR     degen spine changes noted   • Anxiety    • Asthma    • Dyspepsia    • Dyspnea on exertion    • Fatigue    • Former smoker     quit    • GERD (gastroesophageal reflux disease)     EGD GDW 18 Gr II esophagitis, 37 cm, no HH, neg h. pylori, DE bx's c/w reflux   • Heartburn     prn Rolaids, denies prior eval   • HLD (hyperlipidemia)    • Hypertension    • Incomplete right bundle branch block    • Iron deficiency anemia     r/t heavy menstrual bleeding   • Morbid obesity (CMS/HCC)    •  (normal spontaneous vaginal delivery)     x 2 w/o complic   • Peripheral edema      Past Surgical History:   Procedure Laterality Date   • ENDOSCOPY     • LAPAROSCOPIC APPENDECTOMY      [facility]   • LAPAROSCOPIC CHOLECYSTECTOMY      [facility]       No Known Allergies    Current Outpatient Medications:   •  clonazePAM (KlonoPIN) 0.25 MG disintegrating tablet, Take 0.125 mg by mouth 2 (Two) Times a Day As Needed for Anxiety. 1/2 tablet prn, Disp: , Rfl:   •  losartan-hydrochlorothiazide (HYZAAR) 50-12.5 MG per tablet, Take 1 tablet by mouth Daily., Disp: , Rfl:     Social History     Socioeconomic History   • Marital status: Legally      Spouse name: Not on file   • Number of children: Not on file   • Years of education: Not on file   • Highest education level: Not on file   Social Needs   • Financial resource strain: Not on file   • Food insecurity - worry: Not on file   • Food insecurity - inability: Not on file   • Transportation needs - medical: Not on file   • Transportation needs - non-medical: Not on file   Occupational History   • Not on file   Tobacco Use   • Smoking status: Former Smoker     Packs/day: 1.00     Years: 10.00     Pack years: 10.00     Types: Cigarettes     Last attempt to quit:      Years since quittin.1   • Smokeless tobacco: Never Used   Substance and Sexual Activity   • Alcohol use: Yes     Comment: only on the weekends   • Drug use: No   • Sexual activity: Defer   Other Topics Concern   • Not on file   Social History Narrative    Living in [place] w/ 2 daughters.  In [employer] school.     Family History   Problem Relation Age of Onset   • Hypertension Mother    • Hypertension Father    • Sleep apnea Father    • Hypertension Maternal Grandmother    • Hypertension Maternal Grandfather        Review of Systems   Constitutional: Positive for fatigue and unexpected weight gain. Negative for chills, diaphoresis, fever and unexpected weight loss.   HENT: Negative for congestion and facial swelling.    Eyes: Negative for blurred vision, double vision and discharge.   Respiratory: Negative for chest tightness, shortness of breath and stridor.    Cardiovascular: Negative for chest pain, palpitations  and leg swelling.   Gastrointestinal: Negative for blood in stool.   Endocrine: Negative for polydipsia.   Genitourinary: Negative for hematuria.   Musculoskeletal: Positive for arthralgias.   Skin: Negative for color change.   Allergic/Immunologic: Negative for immunocompromised state.   Neurological: Negative for confusion.   Psychiatric/Behavioral: Negative for self-injury.       I have reviewed the ROS and confirm that it's accurate today.    Physical Exam:  Vital Signs:  Weight: 115 kg (254 lb 8 oz)   Body mass index is 41.71 kg/m².  Temp: 97.9 °F (36.6 °C)   Heart Rate: 68   BP: 118/66     Physical Exam   Constitutional: She is oriented to person, place, and time. She appears well-developed and well-nourished.   HENT:   Head: Normocephalic and atraumatic.   Nose: Nose normal.   Eyes: Conjunctivae and EOM are normal. Pupils are equal, round, and reactive to light.   Neck: Normal range of motion. Neck supple. Carotid bruit is not present. No tracheal deviation present. No thyromegaly present.   Cardiovascular: Normal rate, regular rhythm and normal heart sounds.   Pulmonary/Chest: Effort normal and breath sounds normal. No respiratory distress.   Abdominal: Soft. She exhibits no distension. There is no hepatosplenomegaly. There is no tenderness.   Lap appy/hunter scars, star tattoo RLQ   Musculoskeletal: Normal range of motion. She exhibits no edema or deformity.   Neurological: She is alert and oriented to person, place, and time. No cranial nerve deficit. Coordination normal.   Skin: Skin is warm and dry. No rash noted.   Psychiatric: She has a normal mood and affect. Her behavior is normal. Judgment and thought content normal.   Vitals reviewed.      Patient Active Problem List   Diagnosis   • Morbid obesity (CMS/HCC)   • Fatigue   • Dyspepsia   • Dyspnea on exertion   • Anxiety   • Hypertension   • Former smoker   • Heartburn   • Iron deficiency anemia   • Obesity, Class III, BMI 40-49.9 (morbid obesity)  (CMS/[facility])       Assessment:    Karlee De La Cruz is a 33 y.o. year old female with medically complicated obesity.    Weight loss surgery is deemed medically necessary given the following obesity related comorbidities including anxiety, asthma, BARNETT, fatigue, GERD, HTN, BELLA, periph edema, HLD with current Weight: 115 kg (254 lb 8 oz) and Body mass index is 41.71 kg/m²..    Encounter Diagnosis   Name Primary?   • Obesity, Class III, BMI 40-49.9 (morbid obesity) (CMS/[facility]) Yes      Patient is aware that surgery is a tool, and that weight loss is not guaranteed but only seen in the context of appropriate use, follow up and exercise.    The patient was present for an approximately a 2.5 hour discussion of the purpose of weight loss surgery, how WLS is a tool to assist in achieving weight loss goals, the most common complications and how best to avoid them, and the strategies for short and long term weight loss.  Ample opportunity to discuss questions was available both in group and during the time of individual examination.    I reviewed:  1/19 Jacob Bolanos #60 x 3  1/25/19 CXR degen spine changes  11/29/18 EKG iRBBB  1/25/19 CBC, CMP decreased MCH  10/9/18 Psych Deonna Franco PhD good candidate  10/9/18 dietitian subhash  11/14/18 HBT neg  10/9/18 lipid panel , low HDL 39  EGD [facility] 11/19/18 11/29/18 Cards clearance Dr. Victor  11/5/18 PFT's     Please see scanned records that I have reviewed and signed off on today.  All of this in addition to the patient's unique history and exam has been taken into consideration in determining their appropriate candidacy for weight loss surgery.    Complications  of laparoscopic/possible robotic gastric sleeve were discussed. The patient is well aware of the potential complications of surgery that include but not limited to bleeding, infections, deep venous thrombosis, pulmonary embolism, pulmonary complications such as pneumonia, cardiac events, hernias, small bowel  "obstruction, damage to the spleen or other organs, bowel injury, disfiguring scars, failure to lose weight, need for additional surgery, conversion to an open procedure, and death. Patient is also aware of complications which apply in this particular procedure that can include but are not limited to a \"leak\" at the staple line which in some instances may require conversion to gastric bypass.    The patient is aware if a hiatal hernia is encountered, it likely will be repaired.  R/B/A Rx to hiatal hernia repair were discussed as outlined in our long consent form.  Briefly risks in addition to those for LSG include recurrent hernia, NAMITA, dysphagia, esophageal injury, pneumothorax, injury to the vagus nerves, injury to the thoracic duct, aorta or vena cava.    I discussed avoiding all tobacco products and second hand smoke at least 2 weeks pre-operatively and 6 weeks post-operatively to minimize the risk of sleeve leak.  This included discussing the importance of avoiding even secondhand smoke as the risk of leak is increased.  Examples discussed:  I made it very clear that the patient understands they should avoid even riding in a car where someone has previously smoked in the last 2 weeks, living in a house where someone smokes (even if it's in a separate room/patio/attached garage, etc.) we discussed that they should not have a conversation with a group of people who are smoking even if it's outside.  They can be around wood burning fires and barbecue.  I told them I do not know if marijuana has a same effects but my overall recommendation is to avoid it for 2 weeks prior in 6 weeks after surgery.  They also are aware that nicotine may also increase the risk of leak and I strongly encouraged him to avoid that as well for 2 weeks prior in 6 weeks after surgery.    Discussed the risks, benefits and alternative therapies at great length as outlined in our extensive consent forms, consent videos, and educational " teaching process under the direction of the center's .    A copy of the patient's signed informed consent is on file.      Plan:  Laparoscopic sleeve gastrectomy. Thank you Chandler for the opportunity to evaluate Ms. Dorothy Askew MD

## 2019-02-06 ENCOUNTER — ANESTHESIA (OUTPATIENT)
Dept: PERIOP | Facility: HOSPITAL | Age: 34
End: 2019-02-06

## 2019-02-06 ENCOUNTER — HOSPITAL ENCOUNTER (INPATIENT)
Facility: HOSPITAL | Age: 34
LOS: 2 days | Discharge: HOME | End: 2019-02-08
Attending: SURGERY | Admitting: SURGERY

## 2019-02-06 DIAGNOSIS — E66.01 OBESITY, CLASS III, BMI 40-49.9 (MORBID OBESITY) (HCC): ICD-10-CM

## 2019-02-06 LAB
B-HCG UR QL: NEGATIVE
HBA1C MFR BLD: 5.1 % (ref 4.8–5.6)
INTERNAL NEGATIVE CONTROL: NEGATIVE
INTERNAL POSITIVE CONTROL: POSITIVE
Lab: NORMAL
POTASSIUM BLDA-SCNC: 3.76 MMOL/L (ref 3.5–5.3)

## 2019-02-06 PROCEDURE — 25010000002 HYDROMORPHONE PER 4 MG: Performed by: ANESTHESIOLOGY

## 2019-02-06 PROCEDURE — 94799 UNLISTED PULMONARY SVC/PX: CPT

## 2019-02-06 PROCEDURE — 83036 HEMOGLOBIN GLYCOSYLATED A1C: CPT | Performed by: SURGERY

## 2019-02-06 PROCEDURE — 25010000002 ONDANSETRON PER 1 MG: Performed by: SURGERY

## 2019-02-06 PROCEDURE — 25010000002 DEXAMETHASONE SODIUM PHOSPHATE 10 MG/ML SOLUTION: Performed by: ANESTHESIOLOGY

## 2019-02-06 PROCEDURE — 25010000002 PROMETHAZINE PER 50 MG: Performed by: SURGERY

## 2019-02-06 PROCEDURE — 0DB64Z3 EXCISION OF STOMACH, PERCUTANEOUS ENDOSCOPIC APPROACH, VERTICAL: ICD-10-PCS | Performed by: SURGERY

## 2019-02-06 PROCEDURE — 25010000002 PROPOFOL 10 MG/ML EMULSION: Performed by: NURSE ANESTHETIST, CERTIFIED REGISTERED

## 2019-02-06 PROCEDURE — 25010000002 PROPOFOL 1000 MG/ML EMULSION: Performed by: NURSE ANESTHETIST, CERTIFIED REGISTERED

## 2019-02-06 PROCEDURE — 25010000002 BUPRENORPHINE PER 0.1 MG: Performed by: ANESTHESIOLOGY

## 2019-02-06 PROCEDURE — 25010000003 CEFAZOLIN IN DEXTROSE 2-4 GM/100ML-% SOLUTION: Performed by: SURGERY

## 2019-02-06 PROCEDURE — 25010000002 ENOXAPARIN PER 10 MG: Performed by: SURGERY

## 2019-02-06 PROCEDURE — 25010000002 FENTANYL CITRATE (PF) 100 MCG/2ML SOLUTION: Performed by: ANESTHESIOLOGY

## 2019-02-06 PROCEDURE — 25010000002 NEOSTIGMINE 10 MG/10ML SOLUTION: Performed by: NURSE ANESTHETIST, CERTIFIED REGISTERED

## 2019-02-06 PROCEDURE — 43775 LAP SLEEVE GASTRECTOMY: CPT | Performed by: SURGERY

## 2019-02-06 PROCEDURE — 81025 URINE PREGNANCY TEST: CPT | Performed by: ANESTHESIOLOGY

## 2019-02-06 PROCEDURE — 84132 ASSAY OF SERUM POTASSIUM: CPT | Performed by: ANESTHESIOLOGY

## 2019-02-06 PROCEDURE — 25010000002 ONDANSETRON PER 1 MG: Performed by: NURSE ANESTHETIST, CERTIFIED REGISTERED

## 2019-02-06 PROCEDURE — 88307 TISSUE EXAM BY PATHOLOGIST: CPT | Performed by: SURGERY

## 2019-02-06 PROCEDURE — 25010000002 MORPHINE PER 10 MG: Performed by: SURGERY

## 2019-02-06 PROCEDURE — 25010000002 FENTANYL CITRATE (PF) 100 MCG/2ML SOLUTION: Performed by: NURSE ANESTHETIST, CERTIFIED REGISTERED

## 2019-02-06 DEVICE — BLACK REINFORCED INTELLIGENT RELOAD, FOR USE WITH SIGNIA STAPLING SYSTEM
Type: IMPLANTABLE DEVICE | Site: ABDOMEN | Status: FUNCTIONAL
Brand: TRI-STAPLE 2.0

## 2019-02-06 DEVICE — REINFORCED INTELLIGENT RELOAD, FOR USE WITH SIGNIA STAPLING SYSTEM
Type: IMPLANTABLE DEVICE | Site: ABDOMEN | Status: FUNCTIONAL
Brand: TRI-STAPLE 2.0

## 2019-02-06 RX ORDER — NALOXONE HCL 0.4 MG/ML
0.4 VIAL (ML) INJECTION
Status: DISCONTINUED | OUTPATIENT
Start: 2019-02-06 | End: 2019-02-08 | Stop reason: HOSPADM

## 2019-02-06 RX ORDER — PANTOPRAZOLE SODIUM 40 MG/10ML
40 INJECTION, POWDER, LYOPHILIZED, FOR SOLUTION INTRAVENOUS
Status: DISCONTINUED | OUTPATIENT
Start: 2019-02-07 | End: 2019-02-08 | Stop reason: HOSPADM

## 2019-02-06 RX ORDER — LIDOCAINE HYDROCHLORIDE 10 MG/ML
0.5 INJECTION, SOLUTION EPIDURAL; INFILTRATION; INTRACAUDAL; PERINEURAL ONCE AS NEEDED
Status: COMPLETED | OUTPATIENT
Start: 2019-02-06 | End: 2019-02-06

## 2019-02-06 RX ORDER — LORAZEPAM 1 MG/1
1 TABLET ORAL EVERY 12 HOURS PRN
Status: DISCONTINUED | OUTPATIENT
Start: 2019-02-06 | End: 2019-02-08 | Stop reason: HOSPADM

## 2019-02-06 RX ORDER — HYDROCODONE BITARTRATE AND ACETAMINOPHEN 5; 325 MG/1; MG/1
1 TABLET ORAL ONCE AS NEEDED
Status: CANCELLED | OUTPATIENT
Start: 2019-02-06 | End: 2019-02-16

## 2019-02-06 RX ORDER — ONDANSETRON 2 MG/ML
INJECTION INTRAMUSCULAR; INTRAVENOUS AS NEEDED
Status: DISCONTINUED | OUTPATIENT
Start: 2019-02-06 | End: 2019-02-06 | Stop reason: SURG

## 2019-02-06 RX ORDER — CEFAZOLIN SODIUM 2 G/100ML
2 INJECTION, SOLUTION INTRAVENOUS ONCE
Status: DISCONTINUED | OUTPATIENT
Start: 2019-02-06 | End: 2019-02-06 | Stop reason: HOSPADM

## 2019-02-06 RX ORDER — PROMETHAZINE HYDROCHLORIDE 25 MG/ML
6.25 INJECTION, SOLUTION INTRAMUSCULAR; INTRAVENOUS ONCE AS NEEDED
Status: CANCELLED | OUTPATIENT
Start: 2019-02-06

## 2019-02-06 RX ORDER — ALBUTEROL SULFATE 2.5 MG/3ML
2.5 SOLUTION RESPIRATORY (INHALATION) EVERY 4 HOURS PRN
Status: DISCONTINUED | OUTPATIENT
Start: 2019-02-06 | End: 2019-02-08 | Stop reason: HOSPADM

## 2019-02-06 RX ORDER — PROMETHAZINE HYDROCHLORIDE 25 MG/1
25 SUPPOSITORY RECTAL ONCE AS NEEDED
Status: CANCELLED | OUTPATIENT
Start: 2019-02-06

## 2019-02-06 RX ORDER — LABETALOL HYDROCHLORIDE 5 MG/ML
10 INJECTION, SOLUTION INTRAVENOUS
Status: DISCONTINUED | OUTPATIENT
Start: 2019-02-06 | End: 2019-02-08 | Stop reason: HOSPADM

## 2019-02-06 RX ORDER — HYDROMORPHONE HYDROCHLORIDE 1 MG/ML
0.5 INJECTION, SOLUTION INTRAMUSCULAR; INTRAVENOUS; SUBCUTANEOUS
Status: CANCELLED | OUTPATIENT
Start: 2019-02-06

## 2019-02-06 RX ORDER — ONDANSETRON 2 MG/ML
4 INJECTION INTRAMUSCULAR; INTRAVENOUS ONCE AS NEEDED
Status: CANCELLED | OUTPATIENT
Start: 2019-02-06

## 2019-02-06 RX ORDER — SODIUM CHLORIDE, SODIUM LACTATE, POTASSIUM CHLORIDE, CALCIUM CHLORIDE 600; 310; 30; 20 MG/100ML; MG/100ML; MG/100ML; MG/100ML
100 INJECTION, SOLUTION INTRAVENOUS CONTINUOUS
Status: DISCONTINUED | OUTPATIENT
Start: 2019-02-06 | End: 2019-02-06 | Stop reason: HOSPADM

## 2019-02-06 RX ORDER — DIPHENHYDRAMINE HYDROCHLORIDE 50 MG/ML
25 INJECTION INTRAMUSCULAR; INTRAVENOUS EVERY 4 HOURS PRN
Status: DISCONTINUED | OUTPATIENT
Start: 2019-02-06 | End: 2019-02-08 | Stop reason: HOSPADM

## 2019-02-06 RX ORDER — NEOSTIGMINE METHYLSULFATE 1 MG/ML
INJECTION, SOLUTION INTRAVENOUS AS NEEDED
Status: DISCONTINUED | OUTPATIENT
Start: 2019-02-06 | End: 2019-02-06 | Stop reason: SURG

## 2019-02-06 RX ORDER — MORPHINE SULFATE 4 MG/ML
4 INJECTION, SOLUTION INTRAMUSCULAR; INTRAVENOUS
Status: DISCONTINUED | OUTPATIENT
Start: 2019-02-06 | End: 2019-02-08 | Stop reason: HOSPADM

## 2019-02-06 RX ORDER — FAMOTIDINE 20 MG/1
20 TABLET, FILM COATED ORAL ONCE
Status: DISCONTINUED | OUTPATIENT
Start: 2019-02-06 | End: 2019-02-06 | Stop reason: HOSPADM

## 2019-02-06 RX ORDER — FENTANYL CITRATE 50 UG/ML
50 INJECTION, SOLUTION INTRAMUSCULAR; INTRAVENOUS
Status: DISCONTINUED | OUTPATIENT
Start: 2019-02-06 | End: 2019-02-06 | Stop reason: HOSPADM

## 2019-02-06 RX ORDER — LOSARTAN POTASSIUM 50 MG/1
50 TABLET ORAL
Status: DISCONTINUED | OUTPATIENT
Start: 2019-02-07 | End: 2019-02-08 | Stop reason: HOSPADM

## 2019-02-06 RX ORDER — SCOLOPAMINE TRANSDERMAL SYSTEM 1 MG/1
1 PATCH, EXTENDED RELEASE TRANSDERMAL CONTINUOUS
Status: DISCONTINUED | OUTPATIENT
Start: 2019-02-06 | End: 2019-02-06 | Stop reason: HOSPADM

## 2019-02-06 RX ORDER — CHLORHEXIDINE GLUCONATE 0.12 MG/ML
15 RINSE ORAL ONCE
Status: COMPLETED | OUTPATIENT
Start: 2019-02-06 | End: 2019-02-06

## 2019-02-06 RX ORDER — SODIUM CHLORIDE, SODIUM LACTATE, POTASSIUM CHLORIDE, CALCIUM CHLORIDE 600; 310; 30; 20 MG/100ML; MG/100ML; MG/100ML; MG/100ML
150 INJECTION, SOLUTION INTRAVENOUS CONTINUOUS
Status: DISCONTINUED | OUTPATIENT
Start: 2019-02-06 | End: 2019-02-08 | Stop reason: HOSPADM

## 2019-02-06 RX ORDER — IPRATROPIUM BROMIDE AND ALBUTEROL SULFATE 2.5; .5 MG/3ML; MG/3ML
3 SOLUTION RESPIRATORY (INHALATION) ONCE AS NEEDED
Status: CANCELLED | OUTPATIENT
Start: 2019-02-06

## 2019-02-06 RX ORDER — HYDROMORPHONE HYDROCHLORIDE 2 MG/1
2 TABLET ORAL EVERY 4 HOURS PRN
Status: DISCONTINUED | OUTPATIENT
Start: 2019-02-06 | End: 2019-02-08 | Stop reason: HOSPADM

## 2019-02-06 RX ORDER — SIMETHICONE 80 MG
80 TABLET,CHEWABLE ORAL 4 TIMES DAILY PRN
Status: DISCONTINUED | OUTPATIENT
Start: 2019-02-06 | End: 2019-02-08 | Stop reason: HOSPADM

## 2019-02-06 RX ORDER — ATRACURIUM BESYLATE 10 MG/ML
INJECTION, SOLUTION INTRAVENOUS AS NEEDED
Status: DISCONTINUED | OUTPATIENT
Start: 2019-02-06 | End: 2019-02-06 | Stop reason: SURG

## 2019-02-06 RX ORDER — ONDANSETRON 4 MG/1
4 TABLET, FILM COATED ORAL EVERY 6 HOURS PRN
Status: DISCONTINUED | OUTPATIENT
Start: 2019-02-06 | End: 2019-02-08 | Stop reason: HOSPADM

## 2019-02-06 RX ORDER — SODIUM CHLORIDE, SODIUM LACTATE, POTASSIUM CHLORIDE, CALCIUM CHLORIDE 600; 310; 30; 20 MG/100ML; MG/100ML; MG/100ML; MG/100ML
9 INJECTION, SOLUTION INTRAVENOUS CONTINUOUS
Status: DISCONTINUED | OUTPATIENT
Start: 2019-02-06 | End: 2019-02-06 | Stop reason: HOSPADM

## 2019-02-06 RX ORDER — MAGNESIUM HYDROXIDE 1200 MG/15ML
LIQUID ORAL AS NEEDED
Status: DISCONTINUED | OUTPATIENT
Start: 2019-02-06 | End: 2019-02-06 | Stop reason: HOSPADM

## 2019-02-06 RX ORDER — PANTOPRAZOLE SODIUM 40 MG/10ML
40 INJECTION, POWDER, LYOPHILIZED, FOR SOLUTION INTRAVENOUS ONCE
Status: COMPLETED | OUTPATIENT
Start: 2019-02-06 | End: 2019-02-06

## 2019-02-06 RX ORDER — HYDROMORPHONE HYDROCHLORIDE 1 MG/ML
0.5 INJECTION, SOLUTION INTRAMUSCULAR; INTRAVENOUS; SUBCUTANEOUS
Status: DISCONTINUED | OUTPATIENT
Start: 2019-02-06 | End: 2019-02-06 | Stop reason: HOSPADM

## 2019-02-06 RX ORDER — GLYCOPYRROLATE 0.2 MG/ML
INJECTION INTRAMUSCULAR; INTRAVENOUS AS NEEDED
Status: DISCONTINUED | OUTPATIENT
Start: 2019-02-06 | End: 2019-02-06 | Stop reason: SURG

## 2019-02-06 RX ORDER — HYDROCODONE BITARTRATE AND ACETAMINOPHEN 7.5; 325 MG/1; MG/1
1 TABLET ORAL EVERY 4 HOURS PRN
Status: DISCONTINUED | OUTPATIENT
Start: 2019-02-06 | End: 2019-02-08 | Stop reason: HOSPADM

## 2019-02-06 RX ORDER — PROMETHAZINE HYDROCHLORIDE 25 MG/1
25 TABLET ORAL ONCE AS NEEDED
Status: CANCELLED | OUTPATIENT
Start: 2019-02-06

## 2019-02-06 RX ORDER — BUPIVACAINE HYDROCHLORIDE 2.5 MG/ML
INJECTION, SOLUTION EPIDURAL; INFILTRATION; INTRACAUDAL
Status: COMPLETED | OUTPATIENT
Start: 2019-02-06 | End: 2019-02-06

## 2019-02-06 RX ORDER — LABETALOL HYDROCHLORIDE 5 MG/ML
5 INJECTION, SOLUTION INTRAVENOUS
Status: CANCELLED | OUTPATIENT
Start: 2019-02-06

## 2019-02-06 RX ORDER — METOCLOPRAMIDE HYDROCHLORIDE 5 MG/ML
10 INJECTION INTRAMUSCULAR; INTRAVENOUS EVERY 6 HOURS PRN
Status: DISCONTINUED | OUTPATIENT
Start: 2019-02-06 | End: 2019-02-08 | Stop reason: HOSPADM

## 2019-02-06 RX ORDER — MEPERIDINE HYDROCHLORIDE 25 MG/ML
12.5 INJECTION INTRAMUSCULAR; INTRAVENOUS; SUBCUTANEOUS
Status: CANCELLED | OUTPATIENT
Start: 2019-02-06 | End: 2019-02-07

## 2019-02-06 RX ORDER — CLONIDINE HYDROCHLORIDE 0.1 MG/1
0.1 TABLET ORAL EVERY 6 HOURS PRN
Status: DISCONTINUED | OUTPATIENT
Start: 2019-02-06 | End: 2019-02-08 | Stop reason: HOSPADM

## 2019-02-06 RX ORDER — LORAZEPAM 2 MG/ML
0.5 INJECTION INTRAMUSCULAR EVERY 12 HOURS PRN
Status: DISCONTINUED | OUTPATIENT
Start: 2019-02-06 | End: 2019-02-08 | Stop reason: HOSPADM

## 2019-02-06 RX ORDER — LIDOCAINE HYDROCHLORIDE 10 MG/ML
INJECTION, SOLUTION EPIDURAL; INFILTRATION; INTRACAUDAL; PERINEURAL AS NEEDED
Status: DISCONTINUED | OUTPATIENT
Start: 2019-02-06 | End: 2019-02-06 | Stop reason: SURG

## 2019-02-06 RX ORDER — PROPOFOL 10 MG/ML
VIAL (ML) INTRAVENOUS AS NEEDED
Status: DISCONTINUED | OUTPATIENT
Start: 2019-02-06 | End: 2019-02-06 | Stop reason: SURG

## 2019-02-06 RX ORDER — ONDANSETRON 2 MG/ML
4 INJECTION INTRAMUSCULAR; INTRAVENOUS EVERY 6 HOURS PRN
Status: DISCONTINUED | OUTPATIENT
Start: 2019-02-06 | End: 2019-02-08 | Stop reason: HOSPADM

## 2019-02-06 RX ORDER — PROMETHAZINE HYDROCHLORIDE 25 MG/ML
12.5 INJECTION, SOLUTION INTRAMUSCULAR; INTRAVENOUS EVERY 6 HOURS PRN
Status: DISCONTINUED | OUTPATIENT
Start: 2019-02-06 | End: 2019-02-08 | Stop reason: HOSPADM

## 2019-02-06 RX ORDER — CYANOCOBALAMIN 1000 UG/ML
1000 INJECTION, SOLUTION INTRAMUSCULAR; SUBCUTANEOUS ONCE
Status: COMPLETED | OUTPATIENT
Start: 2019-02-07 | End: 2019-02-07

## 2019-02-06 RX ORDER — HYDRALAZINE HYDROCHLORIDE 20 MG/ML
5 INJECTION INTRAMUSCULAR; INTRAVENOUS
Status: CANCELLED | OUTPATIENT
Start: 2019-02-06

## 2019-02-06 RX ORDER — FENTANYL CITRATE 50 UG/ML
INJECTION, SOLUTION INTRAMUSCULAR; INTRAVENOUS AS NEEDED
Status: DISCONTINUED | OUTPATIENT
Start: 2019-02-06 | End: 2019-02-06 | Stop reason: SURG

## 2019-02-06 RX ORDER — SODIUM CHLORIDE 9 MG/ML
INJECTION, SOLUTION INTRAVENOUS AS NEEDED
Status: DISCONTINUED | OUTPATIENT
Start: 2019-02-06 | End: 2019-02-06 | Stop reason: HOSPADM

## 2019-02-06 RX ORDER — MORPHINE SULFATE 4 MG/ML
6 INJECTION, SOLUTION INTRAMUSCULAR; INTRAVENOUS
Status: DISCONTINUED | OUTPATIENT
Start: 2019-02-06 | End: 2019-02-08 | Stop reason: HOSPADM

## 2019-02-06 RX ORDER — DEXAMETHASONE SODIUM PHOSPHATE 10 MG/ML
INJECTION, SOLUTION INTRAMUSCULAR; INTRAVENOUS
Status: COMPLETED | OUTPATIENT
Start: 2019-02-06 | End: 2019-02-06

## 2019-02-06 RX ORDER — SODIUM CHLORIDE 0.9 % (FLUSH) 0.9 %
3 SYRINGE (ML) INJECTION EVERY 12 HOURS SCHEDULED
Status: CANCELLED | OUTPATIENT
Start: 2019-02-06

## 2019-02-06 RX ORDER — FENTANYL CITRATE 50 UG/ML
50 INJECTION, SOLUTION INTRAMUSCULAR; INTRAVENOUS
Status: CANCELLED | OUTPATIENT
Start: 2019-02-06

## 2019-02-06 RX ORDER — SODIUM CHLORIDE 0.9 % (FLUSH) 0.9 %
1-10 SYRINGE (ML) INJECTION AS NEEDED
Status: CANCELLED | OUTPATIENT
Start: 2019-02-06

## 2019-02-06 RX ORDER — CEFAZOLIN SODIUM 2 G/100ML
2 INJECTION, SOLUTION INTRAVENOUS EVERY 8 HOURS
Status: COMPLETED | OUTPATIENT
Start: 2019-02-06 | End: 2019-02-07

## 2019-02-06 RX ORDER — CLONAZEPAM 0.5 MG/1
0.25 TABLET ORAL 2 TIMES DAILY PRN
Status: DISCONTINUED | OUTPATIENT
Start: 2019-02-06 | End: 2019-02-08 | Stop reason: HOSPADM

## 2019-02-06 RX ORDER — NALOXONE HCL 0.4 MG/ML
0.4 VIAL (ML) INJECTION AS NEEDED
Status: CANCELLED | OUTPATIENT
Start: 2019-02-06

## 2019-02-06 RX ORDER — ACETAMINOPHEN 325 MG/1
650 TABLET ORAL ONCE
Status: COMPLETED | OUTPATIENT
Start: 2019-02-06 | End: 2019-02-06

## 2019-02-06 RX ORDER — SODIUM CHLORIDE AND POTASSIUM CHLORIDE 150; 450 MG/100ML; MG/100ML
125 INJECTION, SOLUTION INTRAVENOUS CONTINUOUS
Status: DISCONTINUED | OUTPATIENT
Start: 2019-02-07 | End: 2019-02-08 | Stop reason: HOSPADM

## 2019-02-06 RX ORDER — BUPRENORPHINE HYDROCHLORIDE 0.32 MG/ML
INJECTION INTRAMUSCULAR; INTRAVENOUS
Status: COMPLETED | OUTPATIENT
Start: 2019-02-06 | End: 2019-02-06

## 2019-02-06 RX ADMIN — ONDANSETRON 4 MG: 2 INJECTION INTRAMUSCULAR; INTRAVENOUS at 08:33

## 2019-02-06 RX ADMIN — ATRACURIUM BESYLATE 10 MG: 10 INJECTION, SOLUTION INTRAVENOUS at 08:28

## 2019-02-06 RX ADMIN — HYDROCODONE BITARTRATE AND ACETAMINOPHEN 1 TABLET: 7.5; 325 TABLET ORAL at 20:53

## 2019-02-06 RX ADMIN — MORPHINE SULFATE 4 MG: 4 INJECTION INTRAVENOUS at 15:32

## 2019-02-06 RX ADMIN — GLYCOPYRROLATE 0.4 MG: 0.2 INJECTION, SOLUTION INTRAMUSCULAR; INTRAVENOUS at 08:50

## 2019-02-06 RX ADMIN — FENTANYL CITRATE 25 MCG: 50 INJECTION, SOLUTION INTRAMUSCULAR; INTRAVENOUS at 07:53

## 2019-02-06 RX ADMIN — FENTANYL CITRATE 25 MCG: 50 INJECTION, SOLUTION INTRAMUSCULAR; INTRAVENOUS at 08:54

## 2019-02-06 RX ADMIN — SIMETHICONE 80 MG: 80 TABLET, CHEWABLE ORAL at 14:07

## 2019-02-06 RX ADMIN — SODIUM CHLORIDE, POTASSIUM CHLORIDE, SODIUM LACTATE AND CALCIUM CHLORIDE: 600; 310; 30; 20 INJECTION, SOLUTION INTRAVENOUS at 07:48

## 2019-02-06 RX ADMIN — CHLORHEXIDINE GLUCONATE 15 ML: 1.2 RINSE ORAL at 07:14

## 2019-02-06 RX ADMIN — BUPIVACAINE HYDROCHLORIDE 60 ML: 2.5 INJECTION, SOLUTION EPIDURAL; INFILTRATION; INTRACAUDAL; PERINEURAL at 07:54

## 2019-02-06 RX ADMIN — ACETAMINOPHEN 650 MG: 325 TABLET, FILM COATED ORAL at 07:13

## 2019-02-06 RX ADMIN — HYDROMORPHONE HYDROCHLORIDE 0.5 MG: 1 INJECTION, SOLUTION INTRAMUSCULAR; INTRAVENOUS; SUBCUTANEOUS at 09:45

## 2019-02-06 RX ADMIN — HYDROCODONE BITARTRATE AND ACETAMINOPHEN 1 TABLET: 7.5; 325 TABLET ORAL at 14:31

## 2019-02-06 RX ADMIN — ONDANSETRON 4 MG: 2 INJECTION INTRAMUSCULAR; INTRAVENOUS at 20:53

## 2019-02-06 RX ADMIN — ONDANSETRON 4 MG: 2 INJECTION INTRAMUSCULAR; INTRAVENOUS at 14:28

## 2019-02-06 RX ADMIN — PROPOFOL 200 MG: 10 INJECTION, EMULSION INTRAVENOUS at 07:53

## 2019-02-06 RX ADMIN — FENTANYL CITRATE 50 MCG: 50 INJECTION, SOLUTION INTRAMUSCULAR; INTRAVENOUS at 09:30

## 2019-02-06 RX ADMIN — SODIUM CHLORIDE, POTASSIUM CHLORIDE, SODIUM LACTATE AND CALCIUM CHLORIDE 1000 ML: 600; 310; 30; 20 INJECTION, SOLUTION INTRAVENOUS at 07:14

## 2019-02-06 RX ADMIN — PANTOPRAZOLE SODIUM 40 MG: 40 INJECTION, POWDER, FOR SOLUTION INTRAVENOUS at 07:14

## 2019-02-06 RX ADMIN — DEXAMETHASONE SODIUM PHOSPHATE 4 MG: 10 INJECTION, SOLUTION INTRAMUSCULAR; INTRAVENOUS at 07:54

## 2019-02-06 RX ADMIN — LIDOCAINE HYDROCHLORIDE 50 MG: 10 INJECTION, SOLUTION EPIDURAL; INFILTRATION; INTRACAUDAL; PERINEURAL at 07:53

## 2019-02-06 RX ADMIN — PROMETHAZINE HYDROCHLORIDE 12.5 MG: 25 INJECTION INTRAMUSCULAR; INTRAVENOUS at 16:58

## 2019-02-06 RX ADMIN — ATRACURIUM BESYLATE 40 MG: 10 INJECTION, SOLUTION INTRAVENOUS at 07:53

## 2019-02-06 RX ADMIN — SIMETHICONE 80 MG: 80 TABLET, CHEWABLE ORAL at 09:29

## 2019-02-06 RX ADMIN — NEOSTIGMINE METHYLSULFATE 2.5 MG: 1 INJECTION, SOLUTION INTRAVENOUS at 08:50

## 2019-02-06 RX ADMIN — FENTANYL CITRATE 50 MCG: 50 INJECTION, SOLUTION INTRAMUSCULAR; INTRAVENOUS at 09:18

## 2019-02-06 RX ADMIN — SCOPALAMINE 1 PATCH: 1 PATCH, EXTENDED RELEASE TRANSDERMAL at 07:13

## 2019-02-06 RX ADMIN — BUPRENORPHINE HYDROCHLORIDE 0.3 MG: 0.32 INJECTION INTRAMUSCULAR; INTRAVENOUS at 07:54

## 2019-02-06 RX ADMIN — LIDOCAINE HYDROCHLORIDE 0.5 ML: 10 INJECTION, SOLUTION EPIDURAL; INFILTRATION; INTRACAUDAL; PERINEURAL at 07:14

## 2019-02-06 RX ADMIN — CEFAZOLIN SODIUM 2 G: 2 INJECTION, SOLUTION INTRAVENOUS at 15:29

## 2019-02-06 RX ADMIN — PROPOFOL 25 MCG/KG/MIN: 10 INJECTION, EMULSION INTRAVENOUS at 08:04

## 2019-02-06 NOTE — ANESTHESIA PROCEDURE NOTES
Peripheral Block      Patient location during procedure: OR  Reason for block: at surgeon's request and post-op pain management  Performed by  Anesthesiologist: Lakhwinder Woodard MD  Preanesthetic Checklist  Completed: patient identified, site marked, surgical consent, pre-op evaluation, timeout performed, IV checked, risks and benefits discussed and monitors and equipment checked  Prep:  Pt Position: supine  Sterile barriers:cap, gloves, sterile barriers and mask  Prep: ChloraPrep  Patient monitoring: blood pressure monitoring, continuous pulse oximetry and EKG  Procedure  Sedation:yes  Performed under: general  Guidance:ultrasound guided  Images:still images obtained    Laterality:Bilateral  Block Type:TAP (Subcostal)  Injection Technique:single-shot  Needle Type:short-bevel and echogenic  Needle Gauge:20 G    Medications Used: bupivacaine PF (MARCAINE) 0.25 % injection, 60 mL  dexamethasone sodium phosphate injection, 4 mg  buprenorphine (BUPRENEX) injection, 0.3 mg  Med admintered at 2/6/2019 7:54 AM  Medications  Comment:Block Injection:  LA dose divided between Right and Left block       Adjuncts:  Decadron 4mg PSF, Buprenex 0.3mg (Per total volume of LA)    Post Assessment  Injection Assessment: negative aspiration for heme, incremental injection and no paresthesia on injection  Patient Tolerance:comfortable throughout block  Complications:no  Additional Notes      Under Ultrasound guidance, a BBraun 4inch 360 degree needle was advanced with Normal Saline hydro dissection of tissue.  The Internal Oblique and Transversus Abdominus muscles where visualized.  At or before the aponeurosis of Internal Oblique, local anesthetic spread was visualized in the Transversus Abdominus Plane. Injection was made incrementally with aspiration every 5 mls.  There was no  intravascular injection,  injection pressure was normal, there was no neural injection, and the procedure was completed without difficulty.  Thank  You.

## 2019-02-06 NOTE — ANESTHESIA POSTPROCEDURE EVALUATION
Patient: Karlee De La Cruz    Procedure Summary     Date:  02/06/19 Room / Location:   [facility] 02 /  [facility]    Anesthesia Start:  0748 Anesthesia Stop:  0908    Procedures:       GASTRIC SLEEVE LAPAROSCOPIC (N/A Abdomen)      ESOPHAGOGASTRODUODENOSCOPY (N/A Esophagus) Diagnosis:       Obesity, Class III, BMI 40-49.9 (morbid obesity) (CMS/[facility])      (Obesity, Class III, BMI 40-49.9 (morbid obesity) (CMS/[facility]) [E66.01])    Surgeon:  Lloyd Askew MD Provider:  Lakhwinder Woodard MD    Anesthesia Type:  general with block ASA Status:  3          Anesthesia Type: general with block  Last vitals  BP   140/79   Temp 98   Pulse 87   Resp 16   SpO2 94%     Post Anesthesia Care and Evaluation    Patient location during evaluation: PACU  Patient participation: complete - patient participated  Level of consciousness: awake and alert  Pain score: 0  Pain management: adequate  Airway patency: patent  Anesthetic complications: No anesthetic complications  PONV Status: none  Cardiovascular status: hemodynamically stable and acceptable  Respiratory status: nonlabored ventilation, acceptable and nasal cannula  Hydration status: acceptable

## 2019-02-06 NOTE — PLAN OF CARE
Problem: Patient Care Overview  Goal: Plan of Care Review  Outcome: Ongoing (interventions implemented as appropriate)   02/06/19 7846   Coping/Psychosocial   Plan of Care Reviewed With patient   Plan of Care Review   Progress improving   OTHER   Outcome Summary Patient ambulating, urinating, on room air. Nausea meds given x 2. PRN pain meds x 2. Very motivated. Using IS.

## 2019-02-06 NOTE — PAYOR COMM NOTE
"[facility] # [id number]   Ara Quinones RN, BSN  Phone # [phone]  Fax # [phone]  Karlee De La Cruz (33 y.o. Female)     Date of Birth Social Security Number Address Home Phone MRN    1985  [address] [place] [id number]  [id number]    Latter-day Marital Status          None Legally        Admission Date Admission Type Admitting Provider Attending Provider Department, Room/Bed    2/6/19 Elective Lloyd Askew MD Weiss, George Derek, MD [facility] 2F, S212/1    Discharge Date Discharge Disposition Discharge Destination                       Attending Provider:  Lloyd Askew MD    Allergies:  No Known Allergies    Isolation:  None   Infection:  None   Code Status:  CPR    Ht:  165.1 cm (65\")   Wt:  115 kg (254 lb 8 oz)    Admission Cmt:  None   Principal Problem:  Obesity, Class III, BMI 40-49.9 (morbid obesity) (CMS/HCC) [E66.01] More...                 Active Insurance as of 2/6/2019     Primary Coverage     Payor Plan Insurance Group Employer/Plan Group    [employer] [place] [employer] [place]ICAID      Payor Plan Address Payor Plan Phone Number Payor Plan Fax Number Effective Dates    [address] [address] [phone]  9/5/2018 - None Entered    [facility] [id number]       Subscriber Name Subscriber Birth Date Member ID       KARLEE DE LA CRUZ 1985 [id number]                 Emergency Contacts      (Rel.) Home Phone Work Phone Mobile Phone    Karly Klein (Mother) -- -- [phone]               Operative/Procedure Notes (last 72 hours) (Notes from 2/3/2019  1:10 PM through 2/6/2019  1:10 PM)      Lloyd Askew MD at 2/6/2019  8:07 AM          GASTRIC SLEEVE LAPAROSCOPIC, ESOPHAGOGASTRODUODENOSCOPY  Progress Note    Karlee De La Cruz  2/6/2019    Pre-op Diagnosis:   Obesity, Class III, BMI 40-49.9 (morbid obesity) (CMS/HCC) [E66.01]       Post-Op Diagnosis Codes:     * Obesity, Class III, BMI 40-49.9 (morbid obesity) (CMS/HCC) " [E66.01]    Procedure/CPT® Codes:  NM LAP, SAPPHIRE RESTRICT PROC, LONGITUDINAL GASTRECTOMY [02061]  NM ESOPHAGOGASTRODUODENOSCOPY TRANSORAL DIAGNOSTIC [10073]    Procedure(s):  GASTRIC SLEEVE LAPAROSCOPIC  ESOPHAGOGASTRODUODENOSCOPY    Surgeon(s):  Lloyd Askew MD    Anesthesia: General with Block    Staff:   Circulator: Geena Whalen RN  Scrub Person: Sussy Cruz Faith  Nursing Assistant: Zenia Carlos    Estimated Blood Loss: minimal    Urine Voided: * No values recorded between 2/6/2019  7:48 AM and 2/6/2019  8:55 AM *    Specimens:                ID Type Source Tests Collected by Time   A : SUB-TOTAL GASTRECTOMY Tissue Stomach TISSUE PATHOLOGY EXAM Lloyd Askew MD 2/6/2019 0836         Drains:      Findings:     Complications: none      Lloyd Askew MD     Date: 2/6/2019  Time: 8:55 AM        Electronically signed by Lloyd Askew MD at 2/6/2019  8:56 AM     Lloyd Askew MD at 2/6/2019  8:07 AM        Preoperative Diagnosis:   Morbid Obesity with Multiple Co-Morbidities    Postoperative Diagnosis:   Same    Procedure:                                                      Laparoscopic Sleeve Gastrectomy (85% subtotal vertical gastrectomy) over a 36 Cambodian Bougie Dilator                                                                        EGD    Surgeon:                                                       SYLVIA Askew MD    Anesthesia:                                                   GETA    EBL:                                                              minimal    Fluids:                                                           Crystalloid    Specimens:                                                   Subtotal gastrectomy    Drains:                                                           None    Counts:                                                          Correct    Complications:                                                None    Indications:   This is a 33-year-old morbidly obese white female who presents for elective laparoscopic sleeve gastrectomy.  She's undergone our extensive preoperative education teaching and consent process everything's in order and she wishes to proceed.    Operative technique:     The patient was brought to the operating room, and placed supine upon the operating room table.  SCD hose were placed, she underwent uneventful general endotracheal anesthesia per the anesthesiology staff, she received IV Ancef and subcutaneous Lovenox, the anesthesiology staff performed a tap block, and her abdomen was prepped and draped with ChloraPrep in a sterile fashion, an Ioban was used as well, a Rodgers catheter was not placed.    The peritoneal cavity was entered in the upper abdomen to the left of midline using an 5 mm trocar and an Optiview technique and the abdomen was insufflated to a pressure of 15 mmHg with CO2 gas.  Exploratory laparoscopy revealed no evidence of injury from the entrance technique, a mildly enlarged but smooth o/w normal appearing liver, s/p hunter, mod rectus diastasis, no other abnormalities noted.  Remaining trocars were placed under direct visualization including two additional 5 mm trocars on the left, one on the right (old lap hunter scar), and to the right of the umbilicus a 15 mm trocar was placed off the midline.  Through a stab incision in the epigastrium a Chace retractor was used to elevate the left lobe of the liver exposing the hiatus.  There was no visible hiatal hernia from the anterior view and this was photodocumented.  Beginning approximately two thirds of the way around the greater curvature the stomach, the gastrocolic vessels were divided with the Ligasure device.  This proceeded proximally taking down all the short gastric vessels and exposing the left nguyen.  There were no posterior hernias or lipomas and this was photodocumented.  Gastrocolic vessels were then divided  medially to a few cm proximal to the pylorus.  Some of the filmy attachments of the posterior stomach to the pancreas and retroperitoneum were divided.  The anesthesiology staff passed a 36 Japanese blunt tip bougie dilator which was manipulated along the lesser curvature into the distal antrum.  The 85% subtotal vertical sleeve gastrectomy was then performed over the 36 Japanese bougie dilator using a 8020 Media electric articulating linear staple with dual absorbable strips.  The first firing was a 60 mm black load, the next firing was a 45 mm purple load, the next 3 firings were 60 mm purple loads.  After clamping down the final load and prior to firing it the bougie dilator was removed.  The sleeve was performed such that it was uniform in size, no hourglassing or narrowing, especially at the angularis, and the final firing was done a centimeter away from the angle of His to hopefully avoid incorporating esophageal fibers.  The subtotal gastrectomy specimen was placed into a large retrieval bag and withdrawn and placed on a separate Chester stand, it was a slightly larger than average size specimen.  The sleeve was submerged under saline.  Upper endoscopy was performed, and the endoscope was advanced into the duodenal bulb.  No air bubbles or leak seen, no bleeding at the staple line, no narrowing at the angularis.  There was significant pyloric spasm, it did eventually open up nicely, no deformity.  No gastritis, no hiatal hernia or Mays's esophagus, and the endoscope was withdrawn.  The subtotal gastrectomy specimen was inspected, staples were all well formed confirming laparoscopic findings, and it was sent unopened to pathology for permanent section.  Irrigation fluid was suctioned free.  The sleeve was resting nicely and hemostatic.  The mid sleeve was anchored to the omentum with a running 2-0 vicryl plus suture.   Photodocumentation of the sleeve was obtained.  The Chace retractor was removed.   Fascia at the 15 mm trocar site incision was closed with a horizontal mattress 0 Vicryl suture placed with a suture passer under direct visualization and tying the knot extracorporeally.  Remaining trocars were removed under direct visualization, no bleeding noted from their sites.  Subcutaneous tissue in the 15 mm incision was closed with a figure-of-eight 2-0 Vicryl plus suture, and skin in each incision was closed using 3-0 Monocryl plus in an interrupted subcuticular stitch followed by skin-a-fix.  The patient tolerated the procedure well without complication, was taken to the recovery room in stable condition.    Electronically signed by Lloyd Askew MD at 2/6/2019  8:59 AM

## 2019-02-06 NOTE — BRIEF OP NOTE
GASTRIC SLEEVE LAPAROSCOPIC, ESOPHAGOGASTRODUODENOSCOPY  Progress Note    Karlee De La Cruz  2/6/2019    Pre-op Diagnosis:   Obesity, Class III, BMI 40-49.9 (morbid obesity) (CMS/[facility]) [E66.01]       Post-Op Diagnosis Codes:     * Obesity, Class III, BMI 40-49.9 (morbid obesity) (CMS/[facility]) [E66.01]    Procedure/CPT® Codes:  TX LAP, SAPPHIRE RESTRICT PROC, LONGITUDINAL GASTRECTOMY [43545]  TX ESOPHAGOGASTRODUODENOSCOPY TRANSORAL DIAGNOSTIC [58523]    Procedure(s):  GASTRIC SLEEVE LAPAROSCOPIC  ESOPHAGOGASTRODUODENOSCOPY    Surgeon(s):  Lloyd Askew MD    Anesthesia: General with Block    Staff:   Circulator: Geena Whalen RN  Scrub Person: Sussy Cruz Faith  Nursing Assistant: Zenia Carlos    Estimated Blood Loss: minimal    Urine Voided: * No values recorded between 2/6/2019  7:48 AM and 2/6/2019  8:55 AM *    Specimens:                ID Type Source Tests Collected by Time   A : SUB-TOTAL GASTRECTOMY Tissue Stomach TISSUE PATHOLOGY EXAM Lloyd Askew MD 2/6/2019 0836         Drains:      Findings:     Complications: none      Lloyd Askew MD     Date: 2/6/2019  Time: 8:55 AM

## 2019-02-06 NOTE — ANESTHESIA PROCEDURE NOTES
Airway  Urgency: elective    Airway not difficult    General Information and Staff    Patient location during procedure: OR  CRNA: Jose Juan Prieto CRNA    Indications and Patient Condition  Indications for airway management: airway protection    Preoxygenated: yes  MILS not maintained throughout  Mask difficulty assessment: 1 - vent by mask    Final Airway Details  Final airway type: endotracheal airway      Successful airway: ETT  Cuffed: yes   Successful intubation technique: direct laryngoscopy  Endotracheal tube insertion site: oral  Blade: Hugh  Blade size: 3  ETT size (mm): 7.0  Cormack-Lehane Classification: grade I - full view of glottis  Placement verified by: chest auscultation and capnometry   Measured from: lips  ETT to lips (cm): 20  Number of attempts at approach: 1    Additional Comments  Negative epigastric sounds, Breath sound equal bilaterally with symmetric chest rise and fall. Teeth and lips as preop.

## 2019-02-06 NOTE — ANESTHESIA PREPROCEDURE EVALUATION
Anesthesia Evaluation     Patient summary reviewed and Nursing notes reviewed   no history of anesthetic complications:  NPO Solid Status: > 8 hours  NPO Liquid Status: > 8 hours           Airway   Mallampati: II  TM distance: >3 FB  Neck ROM: full  No difficulty expected  Dental - normal exam     Pulmonary - normal exam    breath sounds clear to auscultation  (+) asthma (mild),   Cardiovascular - normal exam    ECG reviewed  Rhythm: regular  Rate: normal    (+) hypertension,       Neuro/Psych- negative ROS  GI/Hepatic/Renal/Endo    (+) morbid obesity, GERD well controlled,      Musculoskeletal (-) negative ROS    Abdominal    Substance History - negative use     OB/GYN          Other - negative ROS                       Anesthesia Plan    ASA 3     general with block   (Bilateral subcostal TAP blocks post-induction for post-operative analgesia per request of Dr. Askew)  intravenous induction   Anesthetic plan, all risks, benefits, and alternatives have been provided, discussed and informed consent has been obtained with: patient.    Plan discussed with CRNA.

## 2019-02-06 NOTE — OP NOTE
Preoperative Diagnosis:   Morbid Obesity with Multiple Co-Morbidities    Postoperative Diagnosis:   Same    Procedure:                                                      Laparoscopic Sleeve Gastrectomy (85% subtotal vertical gastrectomy) over a 36 [place] Bougie Dilator                                                                        EGD    Surgeon:                                                       SYLVIA Askew MD    Anesthesia:                                                   GETA    EBL:                                                              minimal    Fluids:                                                           Crystalloid    Specimens:                                                   Subtotal gastrectomy    Drains:                                                           None    Counts:                                                          Correct    Complications:                                               None    Indications:   This is a 33-year-old morbidly obese white female who presents for elective laparoscopic sleeve gastrectomy.  She's undergone our extensive preoperative education teaching and consent process everything's in order and she wishes to proceed.    Operative technique:     The patient was brought to the operating room, and placed supine upon the operating room table.  SCD hose were placed, she underwent uneventful general endotracheal anesthesia per the anesthesiology staff, she received IV Ancef and subcutaneous Lovenox, the anesthesiology staff performed a tap block, and her abdomen was prepped and draped with ChloraPrep in a sterile fashion, an Ioban was used as well, a Rodgers catheter was not placed.    The peritoneal cavity was entered in the upper abdomen to the left of midline using an 5 mm trocar and an Optiview technique and the abdomen was insufflated to a pressure of 15 mmHg with CO2 gas.  Exploratory laparoscopy revealed no evidence of injury from  the entrance technique, a mildly enlarged but smooth o/w normal appearing liver, s/p hunter, mod rectus diastasis, no other abnormalities noted.  Remaining trocars were placed under direct visualization including two additional 5 mm trocars on the left, one on the right (old lap hunter scar), and to the right of the umbilicus a 15 mm trocar was placed off the midline.  Through a stab incision in the epigastrium a Chace retractor was used to elevate the left lobe of the liver exposing the hiatus.  There was no visible hiatal hernia from the anterior view and this was photodocumented.  Beginning approximately two thirds of the way around the greater curvature the stomach, the gastrocolic vessels were divided with the Ligasure device.  This proceeded proximally taking down all the short gastric vessels and exposing the left nguyen.  There were no posterior hernias or lipomas and this was photodocumented.  Gastrocolic vessels were then divided medially to a few cm proximal to the pylorus.  Some of the filmy attachments of the posterior stomach to the pancreas and retroperitoneum were divided.  The anesthesiology staff passed a 36 [place] blunt tip bougie dilator which was manipulated along the lesser curvature into the distal antrum.  The 85% subtotal vertical sleeve gastrectomy was then performed over the 36 [place] bougie dilator using a LookFlow electric articulating linear staple with dual absorbable strips.  The first firing was a 60 mm black load, the next firing was a 45 mm purple load, the next 3 firings were 60 mm purple loads.  After clamping down the final load and prior to firing it the bougie dilator was removed.  The sleeve was performed such that it was uniform in size, no hourglassing or narrowing, especially at the angularis, and the final firing was done a centimeter away from the angle of His to hopefully avoid incorporating esophageal fibers.  The subtotal gastrectomy specimen was placed into a  large retrieval bag and withdrawn and placed on a separate [place] stand, it was a slightly larger than average size specimen.  The sleeve was submerged under saline.  Upper endoscopy was performed, and the endoscope was advanced into the duodenal bulb.  No air bubbles or leak seen, no bleeding at the staple line, no narrowing at the angularis.  There was significant pyloric spasm, it did eventually open up nicely, no deformity.  No gastritis, no hiatal hernia or Mays's esophagus, and the endoscope was withdrawn.  The subtotal gastrectomy specimen was inspected, staples were all well formed confirming laparoscopic findings, and it was sent unopened to pathology for permanent section.  Irrigation fluid was suctioned free.  The sleeve was resting nicely and hemostatic.  The mid sleeve was anchored to the omentum with a running 2-0 vicryl plus suture.   Photodocumentation of the sleeve was obtained.  The Chace retractor was removed.  Fascia at the 15 mm trocar site incision was closed with a horizontal mattress 0 Vicryl suture placed with a suture passer under direct visualization and tying the knot extracorporeally.  Remaining trocars were removed under direct visualization, no bleeding noted from their sites.  Subcutaneous tissue in the 15 mm incision was closed with a figure-of-eight 2-0 Vicryl plus suture, and skin in each incision was closed using 3-0 Monocryl plus in an interrupted subcuticular stitch followed by skin-a-fix.  The patient tolerated the procedure well without complication, was taken to the recovery room in stable condition.

## 2019-02-07 ENCOUNTER — APPOINTMENT (OUTPATIENT)
Dept: GENERAL RADIOLOGY | Facility: HOSPITAL | Age: 34
End: 2019-02-07

## 2019-02-07 LAB
ALBUMIN SERPL-MCNC: 3.78 G/DL (ref 3.2–4.8)
ALBUMIN/GLOB SERPL: 2 G/DL (ref 1.5–2.5)
ALP SERPL-CCNC: 56 U/L (ref 25–100)
ALT SERPL W P-5'-P-CCNC: 28 U/L (ref 7–40)
ANION GAP SERPL CALCULATED.3IONS-SCNC: 7 MMOL/L (ref 3–11)
AST SERPL-CCNC: 20 U/L (ref 0–33)
BASOPHILS # BLD AUTO: 0.01 10*3/MM3 (ref 0–0.2)
BASOPHILS NFR BLD AUTO: 0.1 % (ref 0–1)
BILIRUB SERPL-MCNC: 0.4 MG/DL (ref 0.3–1.2)
BUN BLD-MCNC: 6 MG/DL (ref 9–23)
BUN/CREAT SERPL: 10.2 (ref 7–25)
CALCIUM SPEC-SCNC: 8.9 MG/DL (ref 8.7–10.4)
CHLORIDE SERPL-SCNC: 105 MMOL/L (ref 99–109)
CO2 SERPL-SCNC: 27 MMOL/L (ref 20–31)
CREAT BLD-MCNC: 0.59 MG/DL (ref 0.6–1.3)
CYTO UR: NORMAL
DEPRECATED RDW RBC AUTO: 42.2 FL (ref 37–54)
EOSINOPHIL # BLD AUTO: 0.01 10*3/MM3 (ref 0–0.3)
EOSINOPHIL NFR BLD AUTO: 0.1 % (ref 0–3)
ERYTHROCYTE [DISTWIDTH] IN BLOOD BY AUTOMATED COUNT: 12.1 % (ref 11.3–14.5)
GFR SERPL CREATININE-BSD FRML MDRD: 117 ML/MIN/1.73
GLOBULIN UR ELPH-MCNC: 1.9 GM/DL
GLUCOSE BLD-MCNC: 99 MG/DL (ref 70–100)
HCT VFR BLD AUTO: 34.8 % (ref 34.5–44)
HGB BLD-MCNC: 11.5 G/DL (ref 11.5–15.5)
IMM GRANULOCYTES # BLD AUTO: 0.06 10*3/MM3 (ref 0–0.03)
IMM GRANULOCYTES NFR BLD AUTO: 0.4 % (ref 0–0.6)
IRON 24H UR-MRATE: 58 MCG/DL (ref 50–175)
LAB AP CASE REPORT: NORMAL
LAB AP CLINICAL INFORMATION: NORMAL
LYMPHOCYTES # BLD AUTO: 2.43 10*3/MM3 (ref 0.6–4.8)
LYMPHOCYTES NFR BLD AUTO: 15.7 % (ref 24–44)
MCH RBC QN AUTO: 31.7 PG (ref 27–31)
MCHC RBC AUTO-ENTMCNC: 33 G/DL (ref 32–36)
MCV RBC AUTO: 95.9 FL (ref 80–99)
MONOCYTES # BLD AUTO: 1.61 10*3/MM3 (ref 0–1)
MONOCYTES NFR BLD AUTO: 10.4 % (ref 0–12)
NEUTROPHILS # BLD AUTO: 11.4 10*3/MM3 (ref 1.5–8.3)
NEUTROPHILS NFR BLD AUTO: 73.7 % (ref 41–71)
PATH REPORT.FINAL DX SPEC: NORMAL
PATH REPORT.GROSS SPEC: NORMAL
PLATELET # BLD AUTO: 246 10*3/MM3 (ref 150–450)
PMV BLD AUTO: 10.3 FL (ref 6–12)
POTASSIUM BLD-SCNC: 3.8 MMOL/L (ref 3.5–5.5)
PROT SERPL-MCNC: 5.7 G/DL (ref 5.7–8.2)
RBC # BLD AUTO: 3.63 10*6/MM3 (ref 3.89–5.14)
SODIUM BLD-SCNC: 139 MMOL/L (ref 132–146)
WBC NRBC COR # BLD: 15.46 10*3/MM3 (ref 3.5–10.8)

## 2019-02-07 PROCEDURE — 25010000002 HYDROMORPHONE 1 MG/ML SOLUTION: Performed by: SURGERY

## 2019-02-07 PROCEDURE — 0 DIATRIZOATE MEGLUMINE & SODIUM PER 1 ML: Performed by: SURGERY

## 2019-02-07 PROCEDURE — 25010000002 PROMETHAZINE PER 50 MG: Performed by: SURGERY

## 2019-02-07 PROCEDURE — 94799 UNLISTED PULMONARY SVC/PX: CPT

## 2019-02-07 PROCEDURE — 99024 POSTOP FOLLOW-UP VISIT: CPT | Performed by: SURGERY

## 2019-02-07 PROCEDURE — 25010000002 ENOXAPARIN PER 10 MG: Performed by: SURGERY

## 2019-02-07 PROCEDURE — 74241: CPT

## 2019-02-07 PROCEDURE — 85025 COMPLETE CBC W/AUTO DIFF WBC: CPT | Performed by: SURGERY

## 2019-02-07 PROCEDURE — 25010000002 THIAMINE PER 100 MG: Performed by: SURGERY

## 2019-02-07 PROCEDURE — 25010000003 CEFAZOLIN IN DEXTROSE 2-4 GM/100ML-% SOLUTION: Performed by: SURGERY

## 2019-02-07 PROCEDURE — 25010000002 CYANOCOBALAMIN PER 1000 MCG: Performed by: SURGERY

## 2019-02-07 PROCEDURE — 80053 COMPREHEN METABOLIC PANEL: CPT | Performed by: SURGERY

## 2019-02-07 PROCEDURE — 83540 ASSAY OF IRON: CPT | Performed by: SURGERY

## 2019-02-07 RX ADMIN — HYDROCODONE BITARTRATE AND ACETAMINOPHEN 1 TABLET: 7.5; 325 TABLET ORAL at 06:36

## 2019-02-07 RX ADMIN — LOSARTAN POTASSIUM 50 MG: 50 TABLET ORAL at 07:54

## 2019-02-07 RX ADMIN — PROMETHAZINE HYDROCHLORIDE 12.5 MG: 25 INJECTION INTRAMUSCULAR; INTRAVENOUS at 00:33

## 2019-02-07 RX ADMIN — HYDROMORPHONE HYDROCHLORIDE 2 MG: 2 TABLET ORAL at 17:29

## 2019-02-07 RX ADMIN — SIMETHICONE 80 MG: 80 TABLET, CHEWABLE ORAL at 08:10

## 2019-02-07 RX ADMIN — FOLIC ACID 250 ML/HR: 5 INJECTION, SOLUTION INTRAMUSCULAR; INTRAVENOUS; SUBCUTANEOUS at 05:16

## 2019-02-07 RX ADMIN — HYDROMORPHONE HYDROCHLORIDE 2 MG: 2 TABLET ORAL at 13:37

## 2019-02-07 RX ADMIN — POTASSIUM CHLORIDE AND SODIUM CHLORIDE 125 ML/HR: 450; 150 INJECTION, SOLUTION INTRAVENOUS at 08:11

## 2019-02-07 RX ADMIN — SIMETHICONE 80 MG: 80 TABLET, CHEWABLE ORAL at 00:27

## 2019-02-07 RX ADMIN — ONDANSETRON 4 MG: 4 TABLET, FILM COATED ORAL at 06:36

## 2019-02-07 RX ADMIN — HYDROMORPHONE HYDROCHLORIDE 2 MG: 2 TABLET ORAL at 08:10

## 2019-02-07 RX ADMIN — CEFAZOLIN SODIUM 2 G: 2 INJECTION, SOLUTION INTRAVENOUS at 00:27

## 2019-02-07 RX ADMIN — SODIUM CHLORIDE, POTASSIUM CHLORIDE, SODIUM LACTATE AND CALCIUM CHLORIDE 150 ML/HR: 600; 310; 30; 20 INJECTION, SOLUTION INTRAVENOUS at 02:04

## 2019-02-07 RX ADMIN — ONDANSETRON 4 MG: 4 TABLET, FILM COATED ORAL at 13:37

## 2019-02-07 RX ADMIN — PROMETHAZINE HYDROCHLORIDE 12.5 MG: 25 INJECTION INTRAMUSCULAR; INTRAVENOUS at 17:29

## 2019-02-07 RX ADMIN — ENOXAPARIN SODIUM 40 MG: 40 INJECTION SUBCUTANEOUS at 07:57

## 2019-02-07 RX ADMIN — CYANOCOBALAMIN 1000 MCG: 1000 INJECTION, SOLUTION INTRAMUSCULAR; SUBCUTANEOUS at 07:57

## 2019-02-07 RX ADMIN — Medication 30 ML: at 09:17

## 2019-02-07 RX ADMIN — PANTOPRAZOLE SODIUM 40 MG: 40 INJECTION, POWDER, FOR SOLUTION INTRAVENOUS at 05:15

## 2019-02-07 RX ADMIN — HYDROMORPHONE HYDROCHLORIDE 1 MG: 1 INJECTION, SOLUTION INTRAMUSCULAR; INTRAVENOUS; SUBCUTANEOUS at 00:27

## 2019-02-07 RX ADMIN — POTASSIUM CHLORIDE AND SODIUM CHLORIDE 125 ML/HR: 450; 150 INJECTION, SOLUTION INTRAVENOUS at 17:35

## 2019-02-07 NOTE — PROGRESS NOTES
Discharge Planning Assessment  [facility]     Patient Name: Karlee De La Cruz  MRN: [id number]  Today's Date: 2/7/2019    Admit Date: 2/6/2019    Discharge Needs Assessment     Row Name 02/07/19 1113       Living Environment    Lives With  child(ricarda), dependent    Current Living Arrangements  home/apartment/condo    Primary Care Provided by  self    Provides Primary Care For  child(ricarda)    Family Caregiver if Needed  parent(s)    Family Caregiver Names  Mother, Lacy Klein    Quality of Family Relationships  supportive;helpful;involved    Able to Return to Prior Arrangements  yes    Living Arrangement Comments  Lives alone with dependent children in house with no steps.  MotherLacy assists as needed       Resource/Environmental Concerns    Resource/Environmental Concerns  none    Transportation Concerns  car, none       Transition Planning    Patient/Family Anticipates Transition to  home    Transportation Anticipated  family or friend will provide       Discharge Needs Assessment    Readmission Within the Last 30 Days  no previous admission in last 30 days    Concerns to be Addressed  no discharge needs identified    Equipment Currently Used at Home  none    Anticipated Changes Related to Illness  none    Equipment Needed After Discharge  none        Discharge Plan     Row Name 02/07/19 1115       Plan    Plan  Plan is home at discharge    Patient/Family in Agreement with Plan  yes    Plan Comments  Met with patient and mother at bedside.  Lives in house with dependent children.  Mother assists as needed.  PCP is Dr. Fritz.  No home health or DME.  No D/C needs identified.      Final Discharge Disposition Code  01 - home or self-care        Destination      No service coordination in this encounter.      Durable Medical Equipment      No service coordination in this encounter.      Dialysis/Infusion      No service coordination in this encounter.      Home Medical Care      No service coordination in  this encounter.      Community Resources      No service coordination in this encounter.          Demographic Summary     Row Name 02/07/19 1112       General Information    Admission Type  inpatient    Arrived From  operating room    Referral Source  admission list    Reason for Consult  discharge planning    Preferred Language  English        Functional Status     Row Name 02/07/19 1113       Functional Status    Usual Activity Tolerance  good    Current Activity Tolerance  good       Functional Status, IADL    Medications  independent    Meal Preparation  independent    Housekeeping  independent    Laundry  independent    Shopping  independent        Psychosocial    No documentation.       Abuse/Neglect    No documentation.       Legal    No documentation.       Substance Abuse    No documentation.       Patient Forms    No documentation.           Kimberly Wing RN

## 2019-02-07 NOTE — PLAN OF CARE
Problem: Patient Care Overview  Goal: Plan of Care Review  Outcome: Ongoing (interventions implemented as appropriate)   02/07/19 1158   Coping/Psychosocial   Plan of Care Reviewed With patient   Plan of Care Review   Progress improving   OTHER   Outcome Summary PT is ambulating, using her IS, and voiding well       Problem: Bariatric Surgery (Adult,Pediatric)  Goal: Signs and Symptoms of Listed Potential Problems Will be Absent, Minimized or Managed (Bariatric Surgery)  Outcome: Ongoing (interventions implemented as appropriate)   02/07/19 1158   Goal/Outcome Evaluation   Problems Assessed (Bariatric Surgery) all   Problems Present (Bariatric Surgery) pain;postoperative nausea and vomiting

## 2019-02-07 NOTE — PLAN OF CARE
Problem: Patient Care Overview  Goal: Plan of Care Review  Outcome: Ongoing (interventions implemented as appropriate)   02/07/19 0253   Coping/Psychosocial   Plan of Care Reviewed With patient   Plan of Care Review   Progress improving   OTHER   Outcome Summary pt. ambulating and using IS. Pain and nausea controlled with medidcation. Needed 2L when sleeping      Goal: Individualization and Mutuality  Outcome: Ongoing (interventions implemented as appropriate)   02/07/19 0253   Individualization   Patient Specific Interventions assses pain and nausea q2        Problem: Bariatric Surgery (Adult,Pediatric)  Goal: Signs and Symptoms of Listed Potential Problems Will be Absent, Minimized or Managed (Bariatric Surgery)  Outcome: Ongoing (interventions implemented as appropriate)   02/07/19 0253   Goal/Outcome Evaluation   Problems Assessed (Bariatric Surgery) all   Problems Present (Bariatric Surgery) pain;postoperative nausea and vomiting

## 2019-02-07 NOTE — PROGRESS NOTES
Cc: POD#1 LSG nausea    She is alone in the room.  She complains of nausea and poor oral intake otherwise no complaints she is ambulating and voiding well.  No pulmonary complaints.  No bowel movement or flatus.  No fever pulse 98 blood pressure 137/91 she is no apparent distress abdomen is soft, nontender, mildly distended, bowel sounds are hypoactive.  Wounds look okay CMP normal except for BUNs of 6 creatinine of 0.59 iron is 58 white blood count 15.5 with 74 segs 16 lymphs 10 monocytes H&H 11.5 and 35.8 upper GI unremarkable although it appears to me she has quite a bit of air filled bowel throughout her abdomen    Impression: Doing okay except for nausea and poor oral intake.      Land: Continue liquids, out of bed, pulmonary toilet, VTE proph.  See orders

## 2019-02-08 VITALS
OXYGEN SATURATION: 95 % | HEIGHT: 65 IN | DIASTOLIC BLOOD PRESSURE: 94 MMHG | HEART RATE: 87 BPM | SYSTOLIC BLOOD PRESSURE: 127 MMHG | WEIGHT: 253.53 LBS | RESPIRATION RATE: 18 BRPM | TEMPERATURE: 98.6 F | BODY MASS INDEX: 42.24 KG/M2

## 2019-02-08 LAB
ALBUMIN SERPL-MCNC: 3.63 G/DL (ref 3.2–4.8)
ALBUMIN/GLOB SERPL: 1.8 G/DL (ref 1.5–2.5)
ALP SERPL-CCNC: 51 U/L (ref 25–100)
ALT SERPL W P-5'-P-CCNC: 24 U/L (ref 7–40)
ANION GAP SERPL CALCULATED.3IONS-SCNC: 3 MMOL/L (ref 3–11)
AST SERPL-CCNC: 20 U/L (ref 0–33)
BASOPHILS # BLD AUTO: 0.03 10*3/MM3 (ref 0–0.2)
BASOPHILS NFR BLD AUTO: 0.3 % (ref 0–1)
BILIRUB SERPL-MCNC: 0.5 MG/DL (ref 0.3–1.2)
BUN BLD-MCNC: 8 MG/DL (ref 9–23)
BUN/CREAT SERPL: 13.6 (ref 7–25)
CALCIUM SPEC-SCNC: 8.1 MG/DL (ref 8.7–10.4)
CHLORIDE SERPL-SCNC: 104 MMOL/L (ref 99–109)
CO2 SERPL-SCNC: 29 MMOL/L (ref 20–31)
CREAT BLD-MCNC: 0.59 MG/DL (ref 0.6–1.3)
DEPRECATED RDW RBC AUTO: 42.3 FL (ref 37–54)
EOSINOPHIL # BLD AUTO: 0.13 10*3/MM3 (ref 0–0.3)
EOSINOPHIL NFR BLD AUTO: 1.3 % (ref 0–3)
ERYTHROCYTE [DISTWIDTH] IN BLOOD BY AUTOMATED COUNT: 12 % (ref 11.3–14.5)
GFR SERPL CREATININE-BSD FRML MDRD: 117 ML/MIN/1.73
GLOBULIN UR ELPH-MCNC: 2 GM/DL
GLUCOSE BLD-MCNC: 77 MG/DL (ref 70–100)
HCT VFR BLD AUTO: 33.2 % (ref 34.5–44)
HGB BLD-MCNC: 10.9 G/DL (ref 11.5–15.5)
IMM GRANULOCYTES # BLD AUTO: 0.04 10*3/MM3 (ref 0–0.03)
IMM GRANULOCYTES NFR BLD AUTO: 0.4 % (ref 0–0.6)
LYMPHOCYTES # BLD AUTO: 2.69 10*3/MM3 (ref 0.6–4.8)
LYMPHOCYTES NFR BLD AUTO: 26.3 % (ref 24–44)
MCH RBC QN AUTO: 31.6 PG (ref 27–31)
MCHC RBC AUTO-ENTMCNC: 32.8 G/DL (ref 32–36)
MCV RBC AUTO: 96.2 FL (ref 80–99)
MONOCYTES # BLD AUTO: 1.01 10*3/MM3 (ref 0–1)
MONOCYTES NFR BLD AUTO: 9.9 % (ref 0–12)
NEUTROPHILS # BLD AUTO: 6.31 10*3/MM3 (ref 1.5–8.3)
NEUTROPHILS NFR BLD AUTO: 61.8 % (ref 41–71)
PLATELET # BLD AUTO: 210 10*3/MM3 (ref 150–450)
PMV BLD AUTO: 9.7 FL (ref 6–12)
POTASSIUM BLD-SCNC: 3.5 MMOL/L (ref 3.5–5.5)
PROT SERPL-MCNC: 5.6 G/DL (ref 5.7–8.2)
RBC # BLD AUTO: 3.45 10*6/MM3 (ref 3.89–5.14)
SODIUM BLD-SCNC: 136 MMOL/L (ref 132–146)
WBC NRBC COR # BLD: 10.21 10*3/MM3 (ref 3.5–10.8)

## 2019-02-08 PROCEDURE — 94799 UNLISTED PULMONARY SVC/PX: CPT

## 2019-02-08 PROCEDURE — 85025 COMPLETE CBC W/AUTO DIFF WBC: CPT | Performed by: SURGERY

## 2019-02-08 PROCEDURE — 80053 COMPREHEN METABOLIC PANEL: CPT | Performed by: SURGERY

## 2019-02-08 PROCEDURE — 99024 POSTOP FOLLOW-UP VISIT: CPT | Performed by: SURGERY

## 2019-02-08 PROCEDURE — 25010000002 ENOXAPARIN PER 10 MG: Performed by: SURGERY

## 2019-02-08 RX ORDER — ONDANSETRON 4 MG/1
4 TABLET, ORALLY DISINTEGRATING ORAL EVERY 8 HOURS PRN
Qty: 20 TABLET | Refills: 0 | Status: SHIPPED | OUTPATIENT
Start: 2019-02-08 | End: 2019-03-12

## 2019-02-08 RX ORDER — OMEPRAZOLE 40 MG/1
40 CAPSULE, DELAYED RELEASE ORAL DAILY
Qty: 60 CAPSULE | Refills: 0 | Status: SHIPPED | OUTPATIENT
Start: 2019-02-08 | End: 2019-03-12 | Stop reason: SDUPTHER

## 2019-02-08 RX ORDER — LOSARTAN POTASSIUM 50 MG/1
50 TABLET ORAL DAILY
Qty: 30 TABLET | Refills: 0 | Status: SHIPPED | OUTPATIENT
Start: 2019-02-08

## 2019-02-08 RX ORDER — HYDROMORPHONE HYDROCHLORIDE 2 MG/1
2 TABLET ORAL EVERY 4 HOURS PRN
Qty: 30 TABLET | Refills: 0 | Status: SHIPPED | OUTPATIENT
Start: 2019-02-08 | End: 2019-02-18

## 2019-02-08 RX ADMIN — PANTOPRAZOLE SODIUM 40 MG: 40 INJECTION, POWDER, FOR SOLUTION INTRAVENOUS at 05:45

## 2019-02-08 RX ADMIN — HYDROMORPHONE HYDROCHLORIDE 2 MG: 2 TABLET ORAL at 10:49

## 2019-02-08 RX ADMIN — HYDROCODONE BITARTRATE AND ACETAMINOPHEN 1 TABLET: 7.5; 325 TABLET ORAL at 06:37

## 2019-02-08 RX ADMIN — LOSARTAN POTASSIUM 50 MG: 50 TABLET ORAL at 07:59

## 2019-02-08 RX ADMIN — POTASSIUM CHLORIDE AND SODIUM CHLORIDE 125 ML/HR: 450; 150 INJECTION, SOLUTION INTRAVENOUS at 01:22

## 2019-02-08 RX ADMIN — SIMETHICONE 80 MG: 80 TABLET, CHEWABLE ORAL at 00:15

## 2019-02-08 RX ADMIN — ONDANSETRON 4 MG: 4 TABLET, FILM COATED ORAL at 10:49

## 2019-02-08 RX ADMIN — SIMETHICONE 80 MG: 80 TABLET, CHEWABLE ORAL at 06:37

## 2019-02-08 RX ADMIN — HYDROCODONE BITARTRATE AND ACETAMINOPHEN 1 TABLET: 7.5; 325 TABLET ORAL at 00:15

## 2019-02-08 RX ADMIN — ONDANSETRON 4 MG: 4 TABLET, FILM COATED ORAL at 00:15

## 2019-02-08 RX ADMIN — ENOXAPARIN SODIUM 40 MG: 40 INJECTION SUBCUTANEOUS at 07:58

## 2019-02-08 NOTE — PROGRESS NOTES
"Bariatric Surgery     LOS: 2 days   Patient Care Team:  Og Fritz MD as PCP - General (Adolescent Medicine)    Chief Complaint:  POD #2    Subjective     Interval History:  Doing much better today.  No complaints.  Tolerating PO. Denies N/V.  No fevers.  Pain controlled.  Ambulating.  Voiding.  IS [id number] reported.  +Flatus.    Objective     Vital Signs  Blood pressure 127/94, pulse 87, temperature 98.6 °F (37 °C), temperature source Oral, resp. rate 18, height 165.1 cm (65\"), weight 115 kg (253 lb 8.5 oz), SpO2 95 %.    Physical Exam:  General: Alert, NAD  Lungs: Clear  Heart: RRR  Abdomen: soft, appropriate, incisions okay  Extremities: warm, (+) SCDs     Results Review:     I reviewed the patient's new clinical results.    Labs:  Lab Results (last 24 hours)     Procedure Component Value Units Date/Time    Comprehensive Metabolic Panel [[id number]]  (Abnormal) Collected:  02/08/19 0728    Specimen:  Blood Updated:  02/08/19 0816     Glucose 77 mg/dL      BUN 8 mg/dL      Creatinine 0.59 mg/dL      Sodium 136 mmol/L      Potassium 3.5 mmol/L      Chloride 104 mmol/L      CO2 29.0 mmol/L      Calcium 8.1 mg/dL      Total Protein 5.6 g/dL      Albumin 3.63 g/dL      ALT (SGPT) 24 U/L      AST (SGOT) 20 U/L      Alkaline Phosphatase 51 U/L      Total Bilirubin 0.5 mg/dL      eGFR Non African Amer 117 mL/min/1.73      Globulin 2.0 gm/dL      A/G Ratio 1.8 g/dL      BUN/Creatinine Ratio 13.6     Anion Gap 3.0 mmol/L     Narrative:       National Kid[employer] Guidelines    Stage     Description        GFR  1         Normal or High     90+  2         Mild decrease      60-89  3         Moderate decrease  30-59  4         Severe decrease    15-29  5         Kidney failure     <15    The MDRD GFR formula is only valid for adults with stable renal function between ages 18 and 70.    CBC & Differential [[id number]] Collected:  02/08/19 0728    Specimen:  Blood Updated:  02/08/19 0810    Narrative:       The " following orders were created for panel order CBC & Differential.  Procedure                               Abnormality         Status                     ---------                               -----------         ------                     CBC Auto Differential[[id number]]        Abnormal            Final result                 Please view results for these tests on the individual orders.    CBC Auto Differential [[id number]]  (Abnormal) Collected:  02/08/19 0728    Specimen:  Blood Updated:  02/08/19 0810     WBC 10.21 10*3/mm3      RBC 3.45 10*6/mm3      Hemoglobin 10.9 g/dL      Hematocrit 33.2 %      MCV 96.2 fL      MCH 31.6 pg      MCHC 32.8 g/dL      RDW 12.0 %      RDW-SD 42.3 fl      MPV 9.7 fL      Platelets 210 10*3/mm3      Neutrophil % 61.8 %      Lymphocyte % 26.3 %      Monocyte % 9.9 %      Eosinophil % 1.3 %      Basophil % 0.3 %      Immature Grans % 0.4 %      Neutrophils, Absolute 6.31 10*3/mm3      Lymphocytes, Absolute 2.69 10*3/mm3      Monocytes, Absolute 1.01 10*3/mm3      Eosinophils, Absolute 0.13 10*3/mm3      Basophils, Absolute 0.03 10*3/mm3      Immature Grans, Absolute 0.04 10*3/mm3     Tissue Pathology Exam [[id number]] Collected:  02/06/19 0836    Specimen:  Tissue from Stomach Updated:  02/07/19 1650     Case Report --     Surgical Pathology Report                         Case: [id number]                                  Authorizing Provider:  Lloyd Askew MD    Collected:           02/06/2019 08:36 AM          Ordering Location:     [facility]   Received:            02/06/2019 09:23 AM                                 OR                                                                           Pathologist:           Abhishek Dean MD                                                           Specimen:    Stomach, SUB-TOTAL GASTRECTOMY                                                              Clinical Information --     The working history is morbid obesity.        Final Diagnosis --     STOMACH, SUBTOTAL GASTRECTOMY:  Gastric fundic polyps.    DGD/mbc        Gross Description --     Received in formalin labeled as subtotal gastrectomy is a 23.0 cm long stapled portion of stomach with a circumference ranging from 1.0 to 8.0 cm.  The serosa is smooth, tan/pink and glistening.  No gross defects are identified.  The lumen contains a moderate amount of dark brown viscid blood.  The mucosa is tan/pink with a normal rugal folding pattern and is moderately dusky at one end of the specimen.  At least five possible polyps are identified up to 0.3 cm in greatest diameter.  No other masses or gross lesions are identified.  Representative sections are submitted in blocks 1A-1C to include polyps and dusky mucosa.  LED/dlb         Microscopic Description --     Sections of the gastric mucosa and wall show most of the mucosa to be essentially normal with a few scattered small lymphocytic aggregates in lamina propria. In addition, the mucosa shows several small polyps one of which morphologically suggestive of a hyperplastic polyp and the other a fundic type polyp. The muscularis propria and serosa are unremarkable.             Imaging:  Imaging Results (last 24 hours)     Procedure Component Value Units Date/Time    FL Upper GI Single Contrast With KUB [[id number]] Collected:  02/07/19 1411     Updated:  02/07/19 1718    Narrative:       EXAMINATION: FL UPPER GI SINGLE CONTRAST W KUB-     INDICATION: sleeve water soluble; E66.01-Morbid (severe) obesity due to  excess calories     TECHNIQUE: 43 seconds of fluoroscopic time was used for this exam. 14  associated images were saved.  imaging reveals a gaseous abdomen.  Surgical clips are visible in the gallbladder fossa. A suture line is  visible in the left upper quadrant.     COMPARISON: NONE     FINDINGS: Under fluoroscopic observation, patient ingested water-soluble  contrast. The oral phase of deglutition appeared normal. The  esophageal  mucosa appeared grossly normal. There was no evidence of a focal  esophageal stricture. Examination of the stomach demonstrated  postoperative changes that are consistent with a vertical sleeve  gastrectomy. No extravasation of contrast was seen. The gastric folds  and gastric mucosa appeared grossly normal. There was no evidence of a  gastric or duodenal ulcer. There was no delay in gastric emptying. The  duodenal bulb and duodenal C-loop appeared grossly normal.          Impression:       Status post vertical sleeve gastrectomy. There was no  evidence of extraluminal contrast. There was no delay in gastric  emptying.         This report was finalized on 2/7/2019 5:16 PM by Dr. Niya Rivera MD.               Assessment/Plan     POD # 2 s/p LSG.    Doing well. Patient feels well and has met discharge criteria.  Will discharge home with follow up in 1 week with PA.  Rx given for losartan without hctz, dilaudid, PPI, zofran.   Discharge instructions reviewed with patient and all questions answered.        Clarisse Betancur MD  02/08/19  9:09 AM

## 2019-02-08 NOTE — PLAN OF CARE
Problem: Patient Care Overview  Goal: Plan of Care Review  Outcome: Ongoing (interventions implemented as appropriate)   02/08/19 0352   Coping/Psychosocial   Plan of Care Reviewed With patient   Plan of Care Review   Progress improving   OTHER   Outcome Summary pt. is ambulating, using her IS. Tolerating her protein        Problem: Bariatric Surgery (Adult,Pediatric)  Goal: Signs and Symptoms of Listed Potential Problems Will be Absent, Minimized or Managed (Bariatric Surgery)  Outcome: Ongoing (interventions implemented as appropriate)   02/08/19 0352   Goal/Outcome Evaluation   Problems Assessed (Bariatric Surgery) all   Problems Present (Bariatric Surgery) pain;postoperative nausea and vomiting

## 2019-02-08 NOTE — PAYOR COMM NOTE
"Attention: Jayla Campos RN Utilization Review [phone]  Fax # [phone]  Ref # [id number]       Please note discharge date.      Karlee De La Cruz (33 y.o. Female)     Date of Birth Social Security Number Address Home Phone MRN    1985  [address] [place] [id number]  [id number]    Judaism Marital Status          None Legally        Admission Date Admission Type Admitting Provider Attending Provider Department, Room/Bed    2/6/19 Elective Lloyd Askew MD Weiss, George Derek, MD [address], S212/1    Discharge Date Discharge Disposition Discharge Destination         Home or Self Care              Attending Provider:  Lloyd Askew MD    Allergies:  No Known Allergies    Isolation:  None   Infection:  None   Code Status:  CPR    Ht:  165.1 cm (65\")   Wt:  115 kg (253 lb 8.5 oz)    Admission Cmt:  None   Principal Problem:  Obesity, Class III, BMI 40-49.9 (morbid obesity) (CMS/[facility]) [E66.01] More...                 Active Insurance as of 2/6/2019     Primary Coverage     Payor Plan Insurance Group Employer/Plan Group    [employer] [place] [employer] [place]ICA[place]      Payor Plan Address Payor Plan Phone Number Payor Plan Fax Number Effective Dates    [address] [address] [phone]  9/5/2018 - None Entered    [facility] [id number]       Subscriber Name Subscriber Birth Date Member ID       KARLEE DE LA CRUZ 1985 [id number]                 Emergency Contacts      (Rel.) Home Phone Work Phone Mobile Phone    Karly Klein (Mother) -- -- [phone]               Physician Progress Notes (most recent note)      Clarisse Betancur MD at 2/8/2019  9:09 AM          Bariatric Surgery     LOS: 2 days   Patient Care Team:  Og Fritz MD as PCP - General (Adolescent Medicine)    Chief Complaint:  POD #2    Subjective     Interval History:  Doing much better today.  No complaints.  Tolerating PO. Denies N/V.  No fevers.  Pain " "controlled.  Ambulating.  Voiding.  IS [id number] reported.  +Flatus.    Objective     Vital Signs  Blood pressure 127/94, pulse 87, temperature 98.6 °F (37 °C), temperature source Oral, resp. rate 18, height 165.1 cm (65\"), weight 115 kg (253 lb 8.5 oz), SpO2 95 %.    Physical Exam:  General: Alert, NAD  Lungs: Clear  Heart: RRR  Abdomen: soft, appropriate, incisions okay  Extremities: warm, (+) SCDs     Results Review:     I reviewed the patient's new clinical results.    Labs:  Lab Results (last 24 hours)     Procedure Component Value Units Date/Time    Comprehensive Metabolic Panel [[id number]]  (Abnormal) Collected:  02/08/19 0728    Specimen:  Blood Updated:  02/08/19 0816     Glucose 77 mg/dL      BUN 8 mg/dL      Creatinine 0.59 mg/dL      Sodium 136 mmol/L      Potassium 3.5 mmol/L      Chloride 104 mmol/L      CO2 29.0 mmol/L      Calcium 8.1 mg/dL      Total Protein 5.6 g/dL      Albumin 3.63 g/dL      ALT (SGPT) 24 U/L      AST (SGOT) 20 U/L      Alkaline Phosphatase 51 U/L      Total Bilirubin 0.5 mg/dL      eGFR Non African Amer 117 mL/min/1.73      Globulin 2.0 gm/dL      A/G Ratio 1.8 g/dL      BUN/Creatinine Ratio 13.6     Anion Gap 3.0 mmol/L     Narrative:       National Kidney Foundation Guidelines    Stage     Description        GFR  1         Normal or High     90+  2         Mild decrease      60-89  3         Moderate decrease  30-59  4         Severe decrease    15-29  5         Kidney failure     <15    The MDRD GFR formula is only valid for adults with stable renal function between ages 18 and 70.    CBC & Differential [[id number]] Collected:  02/08/19 0728    Specimen:  Blood Updated:  02/08/19 0810    Narrative:       The following orders were created for panel order CBC & Differential.  Procedure                               Abnormality         Status                     ---------                               -----------         ------                     CBC Auto Differential[[id number]] "        Abnormal            Final result                 Please view results for these tests on the individual orders.    CBC Auto Differential [[id number]]  (Abnormal) Collected:  02/08/19 0728    Specimen:  Blood Updated:  02/08/19 0810     WBC 10.21 10*3/mm3      RBC 3.45 10*6/mm3      Hemoglobin 10.9 g/dL      Hematocrit 33.2 %      MCV 96.2 fL      MCH 31.6 pg      MCHC 32.8 g/dL      RDW 12.0 %      RDW-SD 42.3 fl      MPV 9.7 fL      Platelets 210 10*3/mm3      Neutrophil % 61.8 %      Lymphocyte % 26.3 %      Monocyte % 9.9 %      Eosinophil % 1.3 %      Basophil % 0.3 %      Immature Grans % 0.4 %      Neutrophils, Absolute 6.31 10*3/mm3      Lymphocytes, Absolute 2.69 10*3/mm3      Monocytes, Absolute 1.01 10*3/mm3      Eosinophils, Absolute 0.13 10*3/mm3      Basophils, Absolute 0.03 10*3/mm3      Immature Grans, Absolute 0.04 10*3/mm3     Tissue Pathology Exam [[id number]] Collected:  02/06/19 0836    Specimen:  Tissue from Stomach Updated:  02/07/19 1650     Case Report --     Surgical Pathology Report                         Case: [id number]                                  Authorizing Provider:  Lloyd Askew MD    Collected:           02/06/2019 08:36 AM          Ordering Location:     [facility]   Received:            02/06/2019 09:23 AM                                 OR                                                                           Pathologist:           Abhishek Dean MD                                                           Specimen:    Stomach, SUB-TOTAL GASTRECTOMY                                                              Clinical Information --     The working history is morbid obesity.       Final Diagnosis --     STOMACH, SUBTOTAL GASTRECTOMY:  Gastric fundic polyps.    DGD/[facility]        Gross Description --     Received in formalin labeled as subtotal gastrectomy is a 23.0 cm long stapled portion of stomach with a circumference ranging from 1.0 to 8.0 cm.   The serosa is smooth, tan/pink and glistening.  No gross defects are identified.  The lumen contains a moderate amount of dark brown viscid blood.  The mucosa is tan/pink with a normal rugal folding pattern and is moderately dusky at one end of the specimen.  At least five possible polyps are identified up to 0.3 cm in greatest diameter.  No other masses or gross lesions are identified.  Representative sections are submitted in blocks 1A-1C to include polyps and dusky mucosa.  LED/dlb         Microscopic Description --     Sections of the gastric mucosa and wall show most of the mucosa to be essentially normal with a few scattered small lymphocytic aggregates in lamina propria. In addition, the mucosa shows several small polyps one of which morphologically suggestive of a hyperplastic polyp and the other a fundic type polyp. The muscularis propria and serosa are unremarkable.             Imaging:  Imaging Results (last 24 hours)     Procedure Component Value Units Date/Time    FL Upper GI Single Contrast With KUB [[id number]] Collected:  02/07/19 1411     Updated:  02/07/19 1718    Narrative:       EXAMINATION: FL UPPER GI SINGLE CONTRAST W KUB-     INDICATION: sleeve water soluble; E66.01-Morbid (severe) obesity due to  excess calories     TECHNIQUE: 43 seconds of fluoroscopic time was used for this exam. 14  associated images were saved.  imaging reveals a gaseous abdomen.  Surgical clips are visible in the gallbladder fossa. A suture line is  visible in the left upper quadrant.     COMPARISON: NONE     FINDINGS: Under fluoroscopic observation, patient ingested water-soluble  contrast. The oral phase of deglutition appeared normal. The esophageal  mucosa appeared grossly normal. There was no evidence of a focal  esophageal stricture. Examination of the stomach demonstrated  postoperative changes that are consistent with a vertical sleeve  gastrectomy. No extravasation of contrast was seen. The gastric  folds  and gastric mucosa appeared grossly normal. There was no evidence of a  gastric or duodenal ulcer. There was no delay in gastric emptying. The  duodenal bulb and duodenal C-loop appeared grossly normal.          Impression:       Status post vertical sleeve gastrectomy. There was no  evidence of extraluminal contrast. There was no delay in gastric  emptying.         This report was finalized on 2/7/2019 5:16 PM by Dr. Niya Rivera MD.               Assessment/Plan     POD # 2 s/p LSG.    Doing well. Patient feels well and has met discharge criteria.  Will discharge home with follow up in 1 week with PA.  Rx given for losartan without hctz, dilaudid, PPI, zofran.   Discharge instructions reviewed with patient and all questions answered.        Clarisse Betancur MD  02/08/19  9:09 AM      Electronically signed by Clarisse Betancur MD at 2/8/2019  9:10 AM       Discharge Summary     No notes of this type exist for this encounter.        Discharge Order (From admission, onward)    Start     Ordered    02/08/19 0909  Discharge patient  Once     Expected Discharge Date:  02/08/19    Discharge Disposition:  Home or Self Care    Physician of Record for Attribution - Please select from Treatment Team:  MICHAEL PARK [[id number]]    Review needed by CMO to determine Physician of Record:  No       Question Answer Comment   Physician of Record for Attribution - Please select from Treatment Team MICHAEL PARK    Review needed by CMO to determine Physician of Record No        02/08/19 0909

## 2019-02-08 NOTE — PROGRESS NOTES
Case Management Discharge Note    Final Note: Met with patient at bedside.  Going home today.  No discharge needs identified.    Destination      No service has been selected for the patient.      Durable Medical Equipment      No service has been selected for the patient.      Dialysis/Infusion      No service has been selected for the patient.      Home Medical Care      No service has been selected for the patient.      Community Resources      No service has been selected for the patient.             Final Discharge Disposition Code: 01 - home or self-care

## 2019-02-11 NOTE — DISCHARGE SUMMARY
Admission Diagnosis:   Morbid Obesity with Multiple Co-morbidities    Discharge Diagnosis:  Same    Principal Procedure Performed:   Laparoscopic sleeve gastrectomy,  EGD    Other procedures performed: Upper GI    Complications: None    Consultations: None    History of present illness:  This is a 33-year-old morbidly obese patient who presents for elective laparoscopic sleeve gastrectomy.  The preoperative testing and evaluation is in order and the patient is admitted for elective surgery.    Hospital Course:  The patient was admitted and underwent uneventful surgery as described.  Postoperatively the patient was transferred to the bariatric telemetry unit. Upper GI on postoperative day #1 was unremarkable.  A lot of nausea and poor po intake POD#1.  At the time of discharge on postoperative day #2 the patient is tolerating a diet and pain is controlled with oral medication.  The patient is afebrile and abdominal exam is appropriate, wounds look okay.  The patient is discharged home in good condition.  Discharge instructions were discussed.  The medication reconciliation has been completed.  The patient is to follow-up in 1-2 weeks in the office.

## 2019-02-18 ENCOUNTER — OFFICE VISIT (OUTPATIENT)
Dept: BARIATRICS/WEIGHT MGMT | Facility: CLINIC | Age: 34
End: 2019-02-18

## 2019-02-18 VITALS
WEIGHT: 237.5 LBS | DIASTOLIC BLOOD PRESSURE: 88 MMHG | HEIGHT: 65 IN | OXYGEN SATURATION: 99 % | HEART RATE: 105 BPM | SYSTOLIC BLOOD PRESSURE: 130 MMHG | TEMPERATURE: 97.8 F | BODY MASS INDEX: 39.57 KG/M2 | RESPIRATION RATE: 18 BRPM

## 2019-02-18 DIAGNOSIS — Z98.84 STATUS POST BARIATRIC SURGERY: Primary | ICD-10-CM

## 2019-02-18 DIAGNOSIS — E66.9 OBESITY, CLASS II, BMI 35-39.9: ICD-10-CM

## 2019-02-18 PROCEDURE — 99024 POSTOP FOLLOW-UP VISIT: CPT | Performed by: PHYSICIAN ASSISTANT

## 2019-03-12 ENCOUNTER — OFFICE VISIT (OUTPATIENT)
Dept: BARIATRICS/WEIGHT MGMT | Facility: CLINIC | Age: 34
End: 2019-03-12

## 2019-03-12 VITALS
RESPIRATION RATE: 18 BRPM | WEIGHT: 227.5 LBS | SYSTOLIC BLOOD PRESSURE: 132 MMHG | TEMPERATURE: 97.9 F | BODY MASS INDEX: 37.9 KG/M2 | HEIGHT: 65 IN | OXYGEN SATURATION: 98 % | HEART RATE: 82 BPM | DIASTOLIC BLOOD PRESSURE: 76 MMHG

## 2019-03-12 DIAGNOSIS — R53.83 FATIGUE, UNSPECIFIED TYPE: ICD-10-CM

## 2019-03-12 DIAGNOSIS — E55.9 VITAMIN D DEFICIENCY: ICD-10-CM

## 2019-03-12 DIAGNOSIS — E66.9 OBESITY, CLASS II, BMI 35-39.9: ICD-10-CM

## 2019-03-12 DIAGNOSIS — I10 ESSENTIAL HYPERTENSION: ICD-10-CM

## 2019-03-12 DIAGNOSIS — D50.9 IRON DEFICIENCY ANEMIA, UNSPECIFIED IRON DEFICIENCY ANEMIA TYPE: ICD-10-CM

## 2019-03-12 DIAGNOSIS — R10.13 DYSPEPSIA: Primary | ICD-10-CM

## 2019-03-12 PROBLEM — E66.01 OBESITY, CLASS III, BMI 40-49.9 (MORBID OBESITY) (HCC): Status: RESOLVED | Noted: 2019-01-30 | Resolved: 2019-03-12

## 2019-03-12 PROCEDURE — 99024 POSTOP FOLLOW-UP VISIT: CPT | Performed by: PHYSICIAN ASSISTANT

## 2019-03-12 RX ORDER — OMEPRAZOLE 40 MG/1
40 CAPSULE, DELAYED RELEASE ORAL DAILY
Qty: 30 CAPSULE | Refills: 0 | Status: SHIPPED | OUTPATIENT
Start: 2019-03-12

## 2019-03-12 NOTE — PROGRESS NOTES
[facility] Bariatric Surgery  [address]  [facility] [address] [phone]      Patient Name:  Karlee De La Cruz.  :  1985      Date of Visit: 2019      Reason for Visit:  1 month postop    HPI:  Karlee De La Cruz is a 33 y.o. female s/p LSG by GDW on 19    Doing fine.  Tolerating diet progression w/out issue.  Denies dysphagia/reflux/N/V.  Does have some (R) side pain that just started yesterday, but says she thinks she just pulled a muscle.  No associated sx.  s/p remote hunter/appy.  Nontender on exam today.  AVSS.  Only other complaint is fatigue.  Says drinking 2 protein shakes/day and eating protein o/w.  Not tracking calories.  Taking Purity vitamins.  On Omeprazole  - needing a refill.  Still avoiding diuretics - says needs BP med refill also.       Presurgery weight:  253 pounds. Today's weight is 103 kg (227 lb 8 oz) pounds, today's Body mass index is 37.86 kg/m²., and her weight loss since surgery is 26 pounds.       Past Medical History:   Diagnosis Date   • Abnormal CXR     degen spine changes noted   • Anxiety    • Asthma    • Dyspepsia    • Dyspnea on exertion    • Fatigue    • Former smoker     quit    • GERD (gastroesophageal reflux disease)     EGD GDW 18 Gr II esophagitis, 37 cm, no HH, neg h. pylori, DE bx's c/w reflux   • Heartburn     prn Rolaids, denies prior eval   • HLD (hyperlipidemia)    • Hypertension    • Incomplete right bundle branch block    • Iron deficiency anemia     r/t heavy menstrual bleeding   • Morbid obesity (CMS/HCC)    •  (normal spontaneous vaginal delivery)     x 2 w/o complic   • Peripheral edema      Past Surgical History:   Procedure Laterality Date   • ENDOSCOPY     • ENDOSCOPY N/A 2019    Procedure: ESOPHAGOGASTRODUODENOSCOPY;  Surgeon: Lloyd Askew MD;  Location: [facility];  Service: Bariatric   • GASTRIC SLEEVE LAPAROSCOPIC N/A 2019    Procedure: GASTRIC SLEEVE  "LAPAROSCOPIC;  Surgeon: Lloyd Askew MD;  Location: [facility];  Service: Bariatric   • LAPAROSCOPIC APPENDECTOMY      [facility]   • LAPAROSCOPIC CHOLECYSTECTOMY      for stones. [facility]     Outpatient Medications Marked as Taking for the 3/12/19 encounter (Office Visit) with Letty Hagan PA   Medication Sig Dispense Refill   • clonazePAM (KlonoPIN) 0.25 MG disintegrating tablet Take 0.125 mg by mouth 2 (Two) Times a Day As Needed for Anxiety. 1/2 tablet prn     • losartan (COZAAR) 50 MG tablet Take 1 tablet by mouth Daily. 30 tablet 0   • omeprazole (PRILOSEC) 40 MG capsule Take 1 capsule by mouth Daily. 30 capsule 0   • [DISCONTINUED] omeprazole (PRILOSEC) 40 MG capsule Take 1 capsule by mouth Daily. 60 capsule 0     No Known Allergies    Social History     Socioeconomic History   • Marital status: Legally      Spouse name: Not on file   • Number of children: Not on file   • Years of education: Not on file   • Highest education level: Not on file   Social Needs   • Financial resource strain: Not on file   • Food insecurity - worry: Not on file   • Food insecurity - inability: Not on file   • Transportation needs - medical: Not on file   • Transportation needs - non-medical: Not on file   Occupational History   • Not on file   Tobacco Use   • Smoking status: Former Smoker     Packs/day: 1.00     Years: 10.00     Pack years: 10.00     Types: Cigarettes     Last attempt to quit: 2010     Years since quittin.1   • Smokeless tobacco: Never Used   Substance and Sexual Activity   • Alcohol use: Yes     Comment: only on the weekends   • Drug use: No   • Sexual activity: Defer   Other Topics Concern   • Not on file   Social History Narrative    Living in [facility] w/ 2 daughters.  In [employer].       /76 (BP Location: Right arm, Patient Position: Sitting, Cuff Size: Adult)   Pulse 82   Temp 97.9 °F (36.6 °C) (Temporal)   Resp 18   Ht 165.1 cm (65\")  "  Wt 103 kg (227 lb 8 oz)   SpO2 98%   BMI 37.86 kg/m²     Physical Exam   Constitutional: She appears well-developed and well-nourished. She is cooperative.   obese   HENT:   Mouth/Throat: Oropharynx is clear and moist and mucous membranes are normal.   Eyes: Conjunctivae are normal. No scleral icterus.   Cardiovascular: Normal rate.   Pulmonary/Chest: Effort normal.   Abdominal: Soft. Bowel sounds are normal. She exhibits no distension and no mass. There is no rebound and no guarding.   Lap incisions healed well.  No tenderness.  No hernia   Musculoskeletal: Normal range of motion. She exhibits no edema.   Neurological: She is alert.   Skin: Skin is warm and dry. No rash noted.   Psychiatric: She has a normal mood and affect. Judgment normal.         Assessment:  1 month s/p LSG by JUAN on 2/6/19    ICD-10-CM ICD-9-CM   1. Dyspepsia R10.13 536.8   2. Fatigue, unspecified type R53.83 780.79   3. Essential hypertension I10 401.9   4. Iron deficiency anemia, unspecified iron deficiency anemia type D50.9 280.9   5. Vitamin D deficiency E55.9 268.9   6. Obesity, Class II, BMI 35-39.9 E66.9 278.00       Plan:  Doing fine.  Continue protein 70-100g/day.  Increase calories to 1000/day.  Increase exercise/activity as able.  Routine bariatric labs ordered.  Continue vitamins w/ adjustments pending lab results.  Continue to avoid ASA/NSAIDs/tobacco x 6 weeks postop, steroids x 8 weeks postop.  Follow up w/ PCP re: BP meds.  Omeprazole RX refilled today.  Call w/ problems/concerns.    The patient was instructed to follow up in 2 months, sooner if needed.

## 2019-03-15 LAB
25(OH)D3+25(OH)D2 SERPL-MCNC: 27.1 NG/ML (ref 30–100)
ALBUMIN SERPL-MCNC: 4.4 G/DL (ref 3.5–5.5)
ALBUMIN/GLOB SERPL: 2 {RATIO} (ref 1.2–2.2)
ALP SERPL-CCNC: 74 IU/L (ref 39–117)
ALT SERPL-CCNC: 27 IU/L (ref 0–32)
AST SERPL-CCNC: 19 IU/L (ref 0–40)
BASOPHILS # BLD AUTO: 0 X10E3/UL (ref 0–0.2)
BASOPHILS NFR BLD AUTO: 0 %
BILIRUB SERPL-MCNC: 0.3 MG/DL (ref 0–1.2)
BUN SERPL-MCNC: 13 MG/DL (ref 6–20)
BUN/CREAT SERPL: 22 (ref 9–23)
CALCIUM SERPL-MCNC: 9.6 MG/DL (ref 8.7–10.2)
CHLORIDE SERPL-SCNC: 106 MMOL/L (ref 96–106)
CO2 SERPL-SCNC: 22 MMOL/L (ref 20–29)
CREAT SERPL-MCNC: 0.6 MG/DL (ref 0.57–1)
EOSINOPHIL # BLD AUTO: 0.2 X10E3/UL (ref 0–0.4)
EOSINOPHIL NFR BLD AUTO: 2 %
ERYTHROCYTE [DISTWIDTH] IN BLOOD BY AUTOMATED COUNT: 12.6 % (ref 12.3–15.4)
FERRITIN SERPL-MCNC: 235 NG/ML (ref 15–150)
FOLATE SERPL-MCNC: >20 NG/ML
GLOBULIN SER CALC-MCNC: 2.2 G/DL (ref 1.5–4.5)
GLUCOSE SERPL-MCNC: 82 MG/DL (ref 65–99)
HCT VFR BLD AUTO: 38.4 % (ref 34–46.6)
HGB BLD-MCNC: 12.9 G/DL (ref 11.1–15.9)
IMM GRANULOCYTES # BLD AUTO: 0 X10E3/UL (ref 0–0.1)
IMM GRANULOCYTES NFR BLD AUTO: 0 %
IRON SERPL-MCNC: 42 UG/DL (ref 27–159)
LYMPHOCYTES # BLD AUTO: 2.9 X10E3/UL (ref 0.7–3.1)
LYMPHOCYTES NFR BLD AUTO: 28 %
Lab: NORMAL
MCH RBC QN AUTO: 31.5 PG (ref 26.6–33)
MCHC RBC AUTO-ENTMCNC: 33.6 G/DL (ref 31.5–35.7)
MCV RBC AUTO: 94 FL (ref 79–97)
METHYLMALONATE SERPL-SCNC: 124 NMOL/L (ref 0–378)
MONOCYTES # BLD AUTO: 1 X10E3/UL (ref 0.1–0.9)
MONOCYTES NFR BLD AUTO: 9 %
NEUTROPHILS # BLD AUTO: 6.3 X10E3/UL (ref 1.4–7)
NEUTROPHILS NFR BLD AUTO: 61 %
PLATELET # BLD AUTO: 249 X10E3/UL (ref 150–379)
POTASSIUM SERPL-SCNC: 4.4 MMOL/L (ref 3.5–5.2)
PREALB SERPL-MCNC: 18 MG/DL (ref 14–35)
PROT SERPL-MCNC: 6.6 G/DL (ref 6–8.5)
RBC # BLD AUTO: 4.1 X10E6/UL (ref 3.77–5.28)
SODIUM SERPL-SCNC: 144 MMOL/L (ref 134–144)
VIT B1 BLD-SCNC: 139.9 NMOL/L (ref 66.5–200)
WBC # BLD AUTO: 10.4 X10E3/UL (ref 3.4–10.8)

## 2025-07-22 NOTE — PROGRESS NOTES
HPI   Chief Complaint   Patient presents with    Leg Swelling       Patient is a 72-year-old female with PMHx of osteoporosis who presents to the ED for about 5-6 days of left lower leg swelling. The swelling is localized to the left lateral posterior calf. Denies numbness or tingling./Is having right sided sciatica pain x 3 weeks in the right side.  No saddle anesthesia or urinary/fecal incontinence or retention.  Takes Flexeril and Neurontin for symptom management. She has been unable to walk as much as she usually does due to her back pain. Denies history of travel, major trauma, history of DVTs, recent surgery or hospitalizations. Patient is not on blood thinners. No fevers, chills, chest pain, cough, dyspnea, myalgias, paresthesia's, difficulty ambulating or other neurovascular symptoms.  No other symptoms or concerns at this time.      History provided by:  Patient   used: No            Patient History   Medical History[1]  Surgical History[2]  Family History[3]  Social History[4]    Physical Exam   ED Triage Vitals   Temperature Heart Rate Respirations BP   07/22/25 1344 07/22/25 1344 07/22/25 1344 07/22/25 1344   36.8 °C (98.3 °F) 86 18 139/79      Pulse Ox Temp Source Heart Rate Source Patient Position   07/22/25 1344 07/22/25 1344 07/22/25 1517 07/22/25 1517   99 % Temporal Monitor Lying      BP Location FiO2 (%)     -- --             Physical Exam  Constitutional: Vital signs per nursing notes.  Well developed, well nourished.  No acute distress.    Eyes:  conjunctivae and lids normal  ENT: Ears normal externally; face symmetric. voice normal  Neck: neck supple, no meningismus; trachea midline without deviation  Respiratory: normal respiratory effort and excursion; no rales, rhonchi, or wheezes; equal air entry  Cardiovascular: RRR, 2+ pulses extremities   Neurological: normal speech; CN II-XII grossly intact, normal motor and sensory function  GI: no distention, soft, nontender  :  [facility]  [address]  [facility] [id number] [phone]      Patient Name:  Karlee De La Cruz.  :  1985      Reason for Visit:  POD #12    HPI:  Karlee De La Cruz is a 33 y.o. female s/p LSG by  on 19    POD#1 with nausea and poor oral intake. Discharged POD#2.     Doing well, feeling great. She has some anxiety and concerned about whether she will eat too much and bust open her stomach.   No other issues/concerns. Denies dysphagia, reflux, nausea, vomiting, abdominal pain, pulmonary issues and fevers.  Tolerating diet progression - on stage 2.  Getting 75-90g prot/day, 2-3 shakes.  Soups,  refrired beans, broth, cheese. Drinking 48-50 fluid oz/day, water.  Taking MVI, B12, B1, Calcium, Vit D, iron and Vit C.  On Omeprazole .  Holding ASA , NSAIDs , Tramadol, Hormones, Diuretics , Steroids and Immunologics.  Ambulating- active.     Presurgery weight: 254 pounds.  Today's weight is 108 kg (237 lb 8 oz) pounds, today's  Body mass index is 39.52 kg/m²., and her weight loss since surgery is 17 pounds.       Past Medical History:   Diagnosis Date   • Abnormal CXR     degen spine changes noted   • Anxiety    • Asthma    • Dyspepsia    • Dyspnea on exertion    • Fatigue    • Former smoker     quit    • GERD (gastroesophageal reflux disease)     EGD GDW 18 Gr II esophagitis, 37 cm, no HH, neg h. pylori, DE bx's c/w reflux   • Heartburn     prn Rolaids, denies prior eval   • HLD (hyperlipidemia)    • Hypertension    • Incomplete right bundle branch block    • Iron deficiency anemia     r/t heavy menstrual bleeding   • Morbid obesity (CMS/HCC)    •  (normal spontaneous vaginal delivery)     x 2 w/o complic   • Peripheral edema      Past Surgical History:   Procedure Laterality Date   • ENDOSCOPY     • ENDOSCOPY N/A 2019    Procedure: ESOPHAGOGASTRODUODENOSCOPY;  Surgeon: Lloyd Askew MD;  Location: [facility] OR;   "Service: Bariatric   • GASTRIC SLEEVE LAPAROSCOPIC N/A 2019    Procedure: GASTRIC SLEEVE LAPAROSCOPIC;  Surgeon: Lloyd Askew MD;  Location: [facility];  Service: Bariatric   • LAPAROSCOPIC APPENDECTOMY      [facility]   • LAPAROSCOPIC CHOLECYSTECTOMY      for stones. [facility]     Outpatient Medications Marked as Taking for the 19 encounter (Office Visit) with Ashleigh Landaverde PA-C   Medication Sig Dispense Refill   • clonazePAM (KlonoPIN) 0.25 MG disintegrating tablet Take 0.125 mg by mouth 2 (Two) Times a Day As Needed for Anxiety. 1/2 tablet prn     • losartan (COZAAR) 50 MG tablet Take 1 tablet by mouth Daily. 30 tablet 0   • omeprazole (PRILOSEC) 40 MG capsule Take 1 capsule by mouth Daily. 60 capsule 0     No Known Allergies    Social History     Socioeconomic History   • Marital status: Legally      Spouse name: Not on file   • Number of children: Not on file   • Years of education: Not on file   • Highest education level: Not on file   Social Needs   • Financial resource strain: Not on file   • Food insecurity - worry: Not on file   • Food insecurity - inability: Not on file   • Transportation needs - medical: Not on file   • Transportation needs - non-medical: Not on file   Occupational History   • Not on file   Tobacco Use   • Smoking status: Former Smoker     Packs/day: 1.00     Years: 10.00     Pack years: 10.00     Types: Cigarettes     Last attempt to quit: 2010     Years since quittin.1   • Smokeless tobacco: Never Used   Substance and Sexual Activity   • Alcohol use: Yes     Comment: only on the weekends   • Drug use: No   • Sexual activity: Defer   Other Topics Concern   • Not on file   Social History Narrative    Living in [facility] w/ 2 daughters.  In [employer].       /88 (BP Location: Left arm, Patient Position: Sitting, Cuff Size: Large Adult)   Pulse 105   Temp 97.8 °F (36.6 °C) (Temporal)   Resp 18   Ht 165.1 cm (65\")  " Deferred  Musculoskeletal: normal gait and station; normal digits and nails; normal to palpation; normal strength/tone; neurovascular status intact.  1+ nonpitting edema in the left lower extremity.  No redness.  2+ symmetric dorsalis pedis and posterior tibial pulses.  Skin: normal to inspection; normal to palpation; no rash      ED Course & MDM   ED Course as of 07/22/25 1706   Tue Jul 22, 2025   1520 Lower extremity venous duplex left []   1521 FINDINGS:  There is acute occlusive DVT demonstrated within the left distal  peroneal veins.  The left common femoral, femoral, and popliteal veins demonstrated  normal compressibility, normal phasic venous flow, and normal response  to augmentation.  There is no evidence for echogenic thrombi.  The  posterior tibial veins are patent.  IMPRESSION:  Evidence for acute occlusive DVT within the left distal peroneal  veins.  Signed by Dimas Oswald MD   []   1605 Spoke with Dr. Crandall recommends anticoagulation  []      ED Course User Index  [] Roddy Espinoza PA-C         Diagnoses as of 07/22/25 1706   Lower extremity edema   Acute deep vein thrombosis (DVT) of left peroneal vein (Multi)                 No data recorded     Gregorio Coma Scale Score: 15 (07/22/25 1518 : Karla Caicedo RN)                           Medical Decision Making  Patient is a 72-year-old female with PMHx of osteoporosis who presents to the ED for about 5-6 days of left lower leg swelling.  Vital signs reassuring.  Patient overall appears well and is nontoxic-appearing. Patient has full range of motion of the neck without meningismus.  Satting well on room air.  Not hypoxic.  Not tachycardic. Afebrile.  No leukocytosis or anemia.  Normal kidney and liver function.  Ultrasound of the left lower extremity shows occlusive DVT in the left distal peroneal veins.  I have low suspicion for any arterial thrombus at this time.  Abdomen soft and nontender.  Lungs clear to auscultation   Wt 108 kg (237 lb 8 oz)   SpO2 99%   BMI 39.52 kg/m²   Physical Exam   Constitutional: She is oriented to person, place, and time. She appears well-developed and well-nourished.   HENT:   Head: Normocephalic and atraumatic.   Cardiovascular: Normal rate, regular rhythm and normal heart sounds.   Pulmonary/Chest: Effort normal and breath sounds normal. No respiratory distress. She has no wheezes.   Abdominal: Soft. Bowel sounds are normal. She exhibits no distension. There is no tenderness.   Incisions healing well   Neurological: She is alert and oriented to person, place, and time.   Skin: Skin is warm and dry.   Psychiatric: She has a normal mood and affect. Her behavior is normal. Judgment and thought content normal.         Assessment:   POD #12  s/p LSG by  on 2/6/19    ICD-10-CM ICD-9-CM   1. Status post bariatric surgery Z98.84 V45.86   2. Obesity, Class II, BMI 35-39.9 E66.9 278.00         Plan:  Doing well. Continue to advance diet per manual.  Increase protein intake to 100g/day.  Increase exercise/activity as tolerated.  Reviewed lifting restrictions, nothing >25 lbs x 2 more weeks.  Continue vitamins.  Continue PPI.  Continue to avoid ASA/NSAIDs/tramadol/tobacco x 6 weeks postop, steroids x 8 weeks postop.  Call w/ problems/concerns.    The patient was instructed to follow up in 3 weeks, sooner if needed.    bilaterally.  She denies any chest pain or shortness of breath.  Nothing further workup with CT PE study is indicated at this time.  Will treat outpatient with Eliquis.  Spoke with Dr. Crandall from vascular who recommends anticoagulation and follow-up outpatient.  Discussed side effects and uses of Eliquis.  She was given anticoagulation precautions.  Discussed impression and findings with patient and she feels comfortable returning home.  We discussed very strict precautions include returning for any new or worsening symptoms.  Patient is in agreement with this plan.  She will follow-up with the PCP and vascular within 3 days.  Patient seen and staffed with attending physician.    Differential diagnosis: Ligamentous injury, fracture, dislocation, abscess, cellulitis, lymphangitis, DVT, acute arterial occlusion, necrotizing fasciitis, arthritis, crystal arthropathy, septic arthritis, tendon rupture    Disposition/treatment  1.  See diagnosis    Shared decision-making was used patient feels comfortable returning home     Patient was advised to follow up with recommended provider in 1 day for another evaluation and exam. I advised patient/guardian to return or go to closest emergency room immediately if symptoms change, get worse, new symptoms develop prior to follow up. If there is no improvement in symptoms in the next 24 hours they are advised to return for further evaluation and exam. I also explained the plan and treatment course. Patient/guardian is in agreement with plan, treatment course, and follow up and states verbally that they will comply.    Patient is homegoing. I discussed the differential; results and discharge plan with the patient and/or family/friend/caregiver if present.  I emphasized the importance of follow-up with the physician I referred them to in the timeframe recommended.  I explained reasons for the patient to return to the Emergency Department. They agreed that if they feel their condition  is worsening or if they have any other concern they should call 911 immediately for further assistance. I gave the patient an opportunity to ask all questions they had and answered all of them accordingly. They understand return precautions and discharge instructions. The patient and/or family/friend/caregiver expressed understanding verbally and that they would comply.        This note has been transcribed using voice recognition and may contain grammatical errors, misplaced words, incorrect words, incorrect phrases or other errors.        Procedure  Procedures       [1]   Past Medical History:  Diagnosis Date    Joint pain 2000    Personal history of other diseases of the musculoskeletal system and connective tissue     History of osteoarthritis   [2]   Past Surgical History:  Procedure Laterality Date    ANKLE SURGERY  01/13/2015    Ankle Surgery    FRACTURE SURGERY  2009-patella    JOINT REPLACEMENT  2004-hip    KNEE ARTHROSCOPY W/ DEBRIDEMENT  01/30/2014    Arthroscopy Knee Right    ORTHOPEDIC SURGERY  2014-ankle/foot, 2007-rotator cuff    OTHER SURGICAL HISTORY  01/30/2014    Reported Hx Of Hip Replacement - Right Side    OTHER SURGICAL HISTORY  01/30/2014    Treatment Of Knee Fracture Patellar, Open    ROTATOR CUFF REPAIR  01/30/2014    Rotator Cuff Repair   [3]   Family History  Problem Relation Name Age of Onset    Other (esophageal ulcer) Mother      Osteoporosis Mother      Transient ischemic attack Mother      Diabetes Father      Drug abuse Father      Hypertension Father      Breast cancer Sister      Cataracts Sister      Diabetes Sister      Hypothyroidism Sister      Lung cancer Sister     [4]   Social History  Tobacco Use    Smoking status: Never     Passive exposure: Never    Smokeless tobacco: Never   Vaping Use    Vaping status: Never Used   Substance Use Topics    Alcohol use: Yes     Alcohol/week: 3.0 standard drinks of alcohol     Types: 3 Glasses of wine per week    Drug use: Never         Roddy Espinoza PA-C  07/22/25 [id number]

## (undated) DEVICE — Device: Brand: DEFENDO AIR/WATER/SUCTION AND BIOPSY VALVE

## (undated) DEVICE — MARYLAND JAW LAPAROSCOPIC SEALER/DIVIDER COATED: Brand: LIGASURE

## (undated) DEVICE — POWER SHELL: Brand: SIGNIA

## (undated) DEVICE — ENDOPATH XCEL BLADELESS TROCARS WITH STABILITY SLEEVES: Brand: ENDOPATH XCEL

## (undated) DEVICE — SHT AIR TRANSFR COMFRT GLIDE LT LAT 40X80IN

## (undated) DEVICE — GLV SURG SENSICARE MICRO PF LF 8.5 STRL

## (undated) DEVICE — CONTN GRAD MEAS TRIANG 32OZ BLK

## (undated) DEVICE — COVER,MAYO STAND,XL,STERILE: Brand: MEDLINE

## (undated) DEVICE — TISSUE RETRIEVAL SYSTEM: Brand: INZII RETRIEVAL SYSTEM

## (undated) DEVICE — UNDRPD COMFRT GLD DRYPAD 36X57IN

## (undated) DEVICE — SUT MONOCRYL PLS ANTIB UND 3/0  PS1 27IN

## (undated) DEVICE — PK BARIATRIC 10

## (undated) DEVICE — GOWN,NON-REINFORCED,SIRUS,SET IN SLV,XXL: Brand: MEDLINE

## (undated) DEVICE — TROCAR: Brand: KII FIOS FIRST ENTRY

## (undated) DEVICE — ENDOPATH XCEL UNIVERSAL TROCAR STABLILITY SLEEVES: Brand: ENDOPATH XCEL

## (undated) DEVICE — SKIN AFFIX SURG ADHESIVE 72/CS 0.55ML: Brand: MEDLINE

## (undated) DEVICE — GLV SURG SENSICARE MICRO PF LF 9 STRL

## (undated) DEVICE — COVER,LIGHT HANDLE,FLX,1/PK: Brand: MEDLINE INDUSTRIES, INC.

## (undated) DEVICE — [HIGH FLOW INSUFFLATOR,  DO NOT USE IF PACKAGE IS DAMAGED,  KEEP DRY,  KEEP AWAY FROM SUNLIGHT,  PROTECT FROM HEAT AND RADIOACTIVE SOURCES.]: Brand: PNEUMOSURE

## (undated) DEVICE — SYS CLS PORTSITE CT CLOSESURE 5AND10/12

## (undated) DEVICE — FLTR PLUMEPORT LAP W/CONN STRL

## (undated) DEVICE — APPL CHLORAPREP W/TINT 26ML BLU